# Patient Record
Sex: MALE | Race: WHITE | NOT HISPANIC OR LATINO | Employment: UNEMPLOYED | URBAN - METROPOLITAN AREA
[De-identification: names, ages, dates, MRNs, and addresses within clinical notes are randomized per-mention and may not be internally consistent; named-entity substitution may affect disease eponyms.]

---

## 2017-08-10 ENCOUNTER — HISTORICAL (OUTPATIENT)
Dept: RADIOLOGY | Facility: HOSPITAL | Age: 37
End: 2017-08-10

## 2022-09-04 ENCOUNTER — HOSPITAL ENCOUNTER (EMERGENCY)
Facility: HOSPITAL | Age: 42
Discharge: HOME | End: 2022-09-04
Attending: FAMILY MEDICINE
Payer: MEDICAID

## 2022-09-04 VITALS
TEMPERATURE: 99 F | WEIGHT: 160 LBS | SYSTOLIC BLOOD PRESSURE: 103 MMHG | BODY MASS INDEX: 21.2 KG/M2 | DIASTOLIC BLOOD PRESSURE: 66 MMHG | RESPIRATION RATE: 18 BRPM | OXYGEN SATURATION: 99 % | HEART RATE: 60 BPM | HEIGHT: 73 IN

## 2022-09-04 DIAGNOSIS — G58.8 PINCHED VERTEBRAL NERVE: Primary | ICD-10-CM

## 2022-09-04 PROCEDURE — 96372 THER/PROPH/DIAG INJ SC/IM: CPT | Performed by: FAMILY MEDICINE

## 2022-09-04 PROCEDURE — 99284 EMERGENCY DEPT VISIT MOD MDM: CPT

## 2022-09-04 PROCEDURE — 63600175 PHARM REV CODE 636 W HCPCS: Performed by: FAMILY MEDICINE

## 2022-09-04 RX ORDER — TRAMADOL HYDROCHLORIDE 50 MG/1
50 TABLET ORAL EVERY 6 HOURS PRN
Qty: 12 TABLET | Refills: 0 | Status: SHIPPED | OUTPATIENT
Start: 2022-09-04 | End: 2023-11-06 | Stop reason: ALTCHOICE

## 2022-09-04 RX ORDER — CYCLOBENZAPRINE HCL 10 MG
10 TABLET ORAL 3 TIMES DAILY PRN
Qty: 15 TABLET | Refills: 0 | Status: SHIPPED | OUTPATIENT
Start: 2022-09-04 | End: 2022-09-09

## 2022-09-04 RX ORDER — DEXAMETHASONE SODIUM PHOSPHATE 4 MG/ML
8 INJECTION, SOLUTION INTRA-ARTICULAR; INTRALESIONAL; INTRAMUSCULAR; INTRAVENOUS; SOFT TISSUE
Status: COMPLETED | OUTPATIENT
Start: 2022-09-04 | End: 2022-09-04

## 2022-09-04 RX ORDER — KETOROLAC TROMETHAMINE 30 MG/ML
30 INJECTION, SOLUTION INTRAMUSCULAR; INTRAVENOUS
Status: COMPLETED | OUTPATIENT
Start: 2022-09-04 | End: 2022-09-04

## 2022-09-04 RX ORDER — DEXAMETHASONE SODIUM PHOSPHATE 4 MG/ML
4 INJECTION, SOLUTION INTRA-ARTICULAR; INTRALESIONAL; INTRAMUSCULAR; INTRAVENOUS; SOFT TISSUE
Status: DISCONTINUED | OUTPATIENT
Start: 2022-09-04 | End: 2022-09-04

## 2022-09-04 RX ADMIN — DEXAMETHASONE SODIUM PHOSPHATE 8 MG: 4 INJECTION, SOLUTION INTRA-ARTICULAR; INTRALESIONAL; INTRAMUSCULAR; INTRAVENOUS; SOFT TISSUE at 07:09

## 2022-09-04 RX ADMIN — KETOROLAC TROMETHAMINE 30 MG: 30 INJECTION, SOLUTION INTRAMUSCULAR; INTRAVENOUS at 07:09

## 2022-09-04 NOTE — ED PROVIDER NOTES
Encounter Date: 9/4/2022       History     Chief Complaint   Patient presents with    Back Pain     Onset of middle lower back pain yesterday     Patient is a 41-year-old male who comes in with lower back pain that started yesterday.  He states that he normally has some degree of arthritis on his lower back but last night when he woke up he went to sit up in he had electric flank pain that started in the are that is hurting now, the lumbar area.  He denies any trauma he denies lifting any heavy and he is having a hard time walking because the pain is so sharp.    The history is provided by the patient.   Back Pain   This is a new problem. The current episode started 3 to 5 hours ago. The problem occurs constantly. The problem has been gradually worsening. The pain is associated with no known injury. The pain is present in the lumbar spine. The quality of the pain is described as shooting. The pain does not radiate. The pain is at a severity of 8/10. The symptoms are aggravated by bending and twisting. He has tried nothing for the symptoms. The treatment provided mild relief.   Review of patient's allergies indicates:   Allergen Reactions    Camphor-methyl sal-menthol-pep Hives     No past medical history on file.  No past surgical history on file.  No family history on file.     Review of Systems   Constitutional: Negative.    HENT: Negative.     Respiratory: Negative.     Cardiovascular: Negative.    Endocrine: Negative.    Musculoskeletal:  Positive for back pain.   Neurological: Negative.    Psychiatric/Behavioral: Negative.     All other systems reviewed and are negative.    Physical Exam     Initial Vitals [09/04/22 0635]   BP Pulse Resp Temp SpO2   105/80 (!) 56 18 98.8 °F (37.1 °C) 98 %      MAP       --         Physical Exam    Nursing note and vitals reviewed.  Constitutional: He appears well-developed and well-nourished.   HENT:   Head: Normocephalic.   Eyes: Pupils are equal, round, and reactive to light.    Neck:   Normal range of motion.  Cardiovascular:  Normal rate and regular rhythm.           Pulmonary/Chest: Breath sounds normal.   Abdominal: Abdomen is soft. Bowel sounds are normal.   Musculoskeletal:      Cervical back: Normal and normal range of motion.      Thoracic back: Spasms and tenderness present. Decreased range of motion.      Lumbar back: Normal.        Back:      Neurological: He is alert and oriented to person, place, and time.   Skin: Skin is warm and dry.   Psychiatric: He has a normal mood and affect.       ED Course   Procedures  Labs Reviewed - No data to display       Imaging Results              X-Ray Lumbar Spine Ap And Lateral (In process)                      Medications   dexamethasone injection 8 mg (has no administration in time range)   ketorolac injection 30 mg (has no administration in time range)   dexamethasone injection 4 mg (has no administration in time range)     Medical Decision Making:   Initial Assessment:   This patient is a 41-year-old male who comes in with shocking lower back pain that woke him up out of his sleep  Differential Diagnosis:   Pinched nerve, back strain  Clinical Tests:   Radiological Study: Ordered and Reviewed  ED Management:  As per patient history and where his Pain is located , the pain seems more like a nerve pain.  X-ray of the lumbar spine was done and there appears to be a slight scoliosis but otherwise is normal.  Will discharge home with pain medicine and muscle relaxant                    Clinical Impression:   Final diagnoses:  [G58.8] Pinched vertebral nerve (Primary)             James Acevedo MD  09/04/22 0706

## 2022-09-04 NOTE — Clinical Note
"Ángel Stuart (Robert)bodeaux was seen and treated in our emergency department on 9/4/2022.  He may return to work on 09/06/2022.       If you have any questions or concerns, please don't hesitate to call.      James Acevedo MD"

## 2023-09-06 ENCOUNTER — HOSPITAL ENCOUNTER (EMERGENCY)
Facility: HOSPITAL | Age: 43
Discharge: HOME | End: 2023-09-06
Attending: PHYSICAL MEDICINE & REHABILITATION
Payer: MEDICAID

## 2023-09-06 VITALS
HEART RATE: 80 BPM | RESPIRATION RATE: 16 BRPM | TEMPERATURE: 98 F | HEIGHT: 72 IN | DIASTOLIC BLOOD PRESSURE: 71 MMHG | WEIGHT: 160 LBS | SYSTOLIC BLOOD PRESSURE: 108 MMHG | BODY MASS INDEX: 21.67 KG/M2 | OXYGEN SATURATION: 97 %

## 2023-09-06 DIAGNOSIS — M79.642 PAIN OF LEFT HAND: Primary | ICD-10-CM

## 2023-09-06 DIAGNOSIS — K13.70 ORAL LESION: ICD-10-CM

## 2023-09-06 PROCEDURE — 99284 EMERGENCY DEPT VISIT MOD MDM: CPT

## 2023-09-06 RX ORDER — IBUPROFEN 800 MG/1
800 TABLET ORAL EVERY 6 HOURS PRN
Qty: 20 TABLET | Refills: 0 | Status: SHIPPED | OUTPATIENT
Start: 2023-09-06 | End: 2023-11-06 | Stop reason: ALTCHOICE

## 2023-09-06 RX ORDER — AMOXICILLIN 500 MG/1
500 CAPSULE ORAL 3 TIMES DAILY
Qty: 21 CAPSULE | Refills: 0 | Status: SHIPPED | OUTPATIENT
Start: 2023-09-06 | End: 2023-09-13

## 2023-09-06 NOTE — ED NOTES
On discharge. Valentin taped  fourth and fifth digit of left hand. No discoloration, swelling and pain to touch while taping fingers.

## 2023-09-06 NOTE — ED PROVIDER NOTES
Encounter Date: 9/6/2023       History     Chief Complaint   Patient presents with    Hand Pain     Left 5th digit pain, swelling, onset yesterday     Hand Pain    Left 5th digit pain, swelling, onset yesterday, ALSO WITH CHRONIC CYSTIC LESION LEFT SIDE OF THE MOUTH BEEN GREATER THAN 5 YEARS HAS NOT SEEN A DENTIST OR ORAL SURGEON IN A PERIOD OF TIME    The history is provided by the patient.     Review of patient's allergies indicates:   Allergen Reactions    Camphor-methyl sal-menthol-pep Hives     Past Medical History:   Diagnosis Date    Seizure disorder     last seizure, 2017     Past Surgical History:   Procedure Laterality Date    BRAIN TUMOR EXCISION      2017     History reviewed. No pertinent family history.  Social History     Tobacco Use    Smoking status: Never    Smokeless tobacco: Never    Tobacco comments:     vaps   Substance Use Topics    Alcohol use: Yes     Comment: occassional    Drug use: Never     Review of Systems   HENT:  Positive for dental problem (ORAL/GUM LESION).    Musculoskeletal:  Positive for joint swelling (LEFT 5H PIP).   All other systems reviewed and are negative.      Physical Exam     Initial Vitals [09/06/23 0655]   BP Pulse Resp Temp SpO2   108/79 89 18 97.9 °F (36.6 °C) 97 %      MAP       --         Physical Exam    Nursing note and vitals reviewed.  Constitutional: He appears well-developed and well-nourished.   HENT:   Head: Normocephalic and atraumatic.   CYSTIC LESION ALONG THE LEFT GUM POOR DENTITION THROUGHOUT   Eyes: Conjunctivae and EOM are normal. Pupils are equal, round, and reactive to light.   Neck: Neck supple.   Normal range of motion.  Cardiovascular:  Normal rate and regular rhythm.           Pulmonary/Chest: Breath sounds normal.   Abdominal: Abdomen is soft. Bowel sounds are normal.   Musculoskeletal:         General: Tenderness present.      Cervical back: Normal range of motion and neck supple.      Comments: LEFT 5TH PIP MILD ERYTHEMA MILD SWELLING  DENIES ANY INJURY DECREASED RANGE OF MOTION NO CREPITUS NO GROSS DEFORMITY     Neurological: He is alert and oriented to person, place, and time. He has normal strength. GCS score is 15. GCS eye subscore is 4. GCS verbal subscore is 5. GCS motor subscore is 6.   Skin: Skin is warm and dry. Capillary refill takes less than 2 seconds.   Psychiatric: He has a normal mood and affect.         ED Course   Procedures  Labs Reviewed - No data to display       Imaging Results              X-Ray Hand 3 View Left (Final result)  Result time 09/06/23 07:42:15      Final result by Piyush Lang MD (09/06/23 07:42:15)                   Impression:      No acute abnormality of the left hand.      Electronically signed by: Piyush Lang MD  Date:    09/06/2023  Time:    07:42               Narrative:    EXAMINATION:  Three radiographic views of the LEFT HAND.    CLINICAL HISTORY:  pain;    TECHNIQUE:  Three radiographic views of the LEFT HAND.    COMPARISON:  None.    FINDINGS:  Three views of the left hand demonstrate normal alignment.  There is no fracture.  There is no bony mass lesion.  There is no soft tissue swelling.                                    X-Rays:   Independently Interpreted Readings:   Other Readings:  NEGATIVE    Medications - No data to display  Medical Decision Making  Chief Complaint  Patient presents with  · Hand Pain    Left 5th digit pain, swelling, onset yesterday, ALSO WITH CHRONIC CYSTIC LESION LEFT SIDE OF THE MOUTH BEEN GREATER THAN 5 YEARS HAS NOT SEEN A DENTIST OR ORAL SURGEON IN A PERIOD OF TIME      Amount and/or Complexity of Data Reviewed  Radiology: ordered.     Details: NEGATIVE  Discussion of management or test interpretation with external provider(s): REVIEWED FINDINGS WITH THE PATIENT SUGGEST JORGE TAPING INSTRUCTED HOW TO DO IT FOLLOW UP WITH PCP STRONGLY SUGGEST FOLLOWING UP WITH DENTIST OR ORAL SURGEON SECONDARY TO CHRONIC LESION    Risk  Prescription drug management.                                Clinical Impression:   Final diagnoses:  [M79.642] Pain of left hand (Primary)  [K13.70] Oral lesion        ED Disposition Condition    Discharge Stable          ED Prescriptions       Medication Sig Dispense Start Date End Date Auth. Provider    amoxicillin (AMOXIL) 500 MG capsule Take 1 capsule (500 mg total) by mouth 3 (three) times daily. for 7 days 21 capsule 9/6/2023 9/13/2023 Chris Rodas DO    ibuprofen (ADVIL,MOTRIN) 800 MG tablet Take 1 tablet (800 mg total) by mouth every 6 (six) hours as needed for Pain. 20 tablet 9/6/2023 -- Chris Rodas DO          Follow-up Information       Follow up With Specialties Details Why Contact Info    [facility] In 3 days As needed, NEED A DENTIST/ORAL SURGEON [address] [place] [address] [id number] [phone]             Chrsi Rodas DO  09/06/23 0863

## 2023-09-06 NOTE — ED NOTES
Complaint of pain, swelling to left 5th digit at joint. This started yesterday. 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 Also has swelling to left cheek area.. swelliing pain. He has had this since birth, he never seen by doctor for this and he states swelling, pain worse today. Also has been tired for last few days.

## 2023-11-06 ENCOUNTER — HOSPITAL ENCOUNTER (EMERGENCY)
Facility: HOSPITAL | Age: 43
Discharge: HOME | End: 2023-11-06
Attending: FAMILY MEDICINE
Payer: MEDICAID

## 2023-11-06 VITALS
BODY MASS INDEX: 20.32 KG/M2 | WEIGHT: 150 LBS | TEMPERATURE: 97 F | RESPIRATION RATE: 20 BRPM | DIASTOLIC BLOOD PRESSURE: 78 MMHG | HEART RATE: 60 BPM | OXYGEN SATURATION: 99 % | HEIGHT: 72 IN | SYSTOLIC BLOOD PRESSURE: 122 MMHG

## 2023-11-06 DIAGNOSIS — J01.00 ACUTE NON-RECURRENT MAXILLARY SINUSITIS: Primary | ICD-10-CM

## 2023-11-06 PROCEDURE — 25000003 PHARM REV CODE 250: Performed by: FAMILY MEDICINE

## 2023-11-06 PROCEDURE — 99284 EMERGENCY DEPT VISIT MOD MDM: CPT | Mod: 25

## 2023-11-06 RX ORDER — METHYLPREDNISOLONE 4 MG/1
TABLET ORAL
Qty: 1 EACH | Refills: 0 | Status: SHIPPED | OUTPATIENT
Start: 2023-11-06 | End: 2023-11-27

## 2023-11-06 RX ORDER — TRAMADOL HYDROCHLORIDE 50 MG/1
50 TABLET ORAL EVERY 6 HOURS PRN
Qty: 12 TABLET | Refills: 0 | Status: SHIPPED | OUTPATIENT
Start: 2023-11-06

## 2023-11-06 RX ORDER — HYDROCODONE BITARTRATE AND ACETAMINOPHEN 7.5; 325 MG/1; MG/1
1 TABLET ORAL EVERY 6 HOURS PRN
Status: DISCONTINUED | OUTPATIENT
Start: 2023-11-06 | End: 2023-11-06 | Stop reason: HOSPADM

## 2023-11-06 RX ADMIN — HYDROCODONE BITARTRATE AND ACETAMINOPHEN 1 TABLET: 7.5; 325 TABLET ORAL at 08:11

## 2023-11-06 NOTE — ED NOTES
Pt ambulated to room 1 with steady gait. Pt states he has had a migraine for 3 weeks. Pt rates pain to head 8/10. Pt states he took Excedrin at 6 am. Pt denies needs at this time.

## 2023-11-06 NOTE — ED PROVIDER NOTES
Encounter Date: 11/6/2023       History     Chief Complaint   Patient presents with    Headache     Migraine x 3 weeks took excedrin migraine at 6 am, denies any N/V. Light sensitive. With blurry vision on right eye.     This patient is a 42-year-old male who states he has been having a migraine for the past 3 weeks on and off.  Patient states that he is had an operation in the area of his left temple a proximally 8 years ago and he is concerned because he has not had a headache that lasts this long for awhile.  He denies using any pain medicine.  Denies any weakness but states that his left eye sometimes bothers him.    The history is provided by the patient.     Review of patient's allergies indicates:   Allergen Reactions    Camphor-methyl sal-menthol-pep Hives     Past Medical History:   Diagnosis Date    Seizure disorder     last seizure, 2017     Past Surgical History:   Procedure Laterality Date    BRAIN TUMOR EXCISION      2017     History reviewed. No pertinent family history.  Social History     Tobacco Use    Smoking status: Never    Smokeless tobacco: Never    Tobacco comments:     vaps   Substance Use Topics    Alcohol use: Yes     Comment: occassional    Drug use: Never     Review of Systems   Constitutional: Negative.    HENT: Negative.     Respiratory: Negative.     Cardiovascular: Negative.    Endocrine: Negative.    Musculoskeletal: Negative.    Neurological:  Positive for headaches.   Psychiatric/Behavioral: Negative.     All other systems reviewed and are negative.      Physical Exam     Initial Vitals [11/06/23 0740]   BP Pulse Resp Temp SpO2   116/75 (!) 53 18 96.8 °F (36 °C) 100 %      MAP       --         Physical Exam    Nursing note and vitals reviewed.  Constitutional: He appears well-developed and well-nourished.   HENT:   Head: Normocephalic.   Eyes: Pupils are equal, round, and reactive to light.   Neck:   Normal range of motion.  Cardiovascular:  Normal rate and regular rhythm.            Pulmonary/Chest: Breath sounds normal.   Abdominal: Abdomen is soft. Bowel sounds are normal.   Musculoskeletal:         General: Normal range of motion.      Cervical back: Normal range of motion.     Neurological: He is alert and oriented to person, place, and time. He has normal strength and normal reflexes. He displays a negative Romberg sign.   Skin: Skin is warm and dry.   Psychiatric: He has a normal mood and affect.         ED Course   Procedures  Labs Reviewed - No data to display       Imaging Results              CT Maxillofacial Without Contrast (Final result)  Result time 11/06/23 09:16:04      Final result by Joy Rogers MD (11/06/23 09:16:04)                   Impression:      No acute abnormality identified.      Electronically signed by: Joy Rogers  Date:    11/06/2023  Time:    09:16               Narrative:    EXAMINATION:  CT MAXILLOFACIAL WITHOUT CONTRAST    CLINICAL HISTORY:  Head/neck cancer, monitor;    TECHNIQUE:  Volumetric CT acquisition of the facial bones without contrast. Axial, coronal and sagittal reconstructions.    Automatic exposure control was utilized to limit radiation dose.    DLP: 1083 mGy-cm    COMPARISON:  CT face dated 10/19/2019    FINDINGS:  There is no acute fracture identified.  There are postoperative changes of the left frontal calvarium.  There is mild scattered paranasal sinus mucosal thickening.  The mastoid air cells are clear.  The orbits and soft tissues are unremarkable.                                       CT Head Without Contrast (Final result)  Result time 11/06/23 09:14:00      Final result by Joy Rogers MD (11/06/23 09:14:00)                   Impression:      No acute intracranial abnormality or significant interval change.      Electronically signed by: Joy Rogers  Date:    11/06/2023  Time:    09:14               Narrative:    EXAMINATION:  CT HEAD WITHOUT CONTRAST    CLINICAL HISTORY:  Brain/CNS neoplasm,  monitor;    TECHNIQUE:  Axial scans were obtained from skull base to the vertex.    Coronal and sagittal reconstructions obtained from the axial data.    Automatic exposure control was utilized to limit radiation dose.    Contrast: None    Radiation Dose:    Total DLP: 1083 mGy*cm    COMPARISON:  CT head dated 08/16/2016    FINDINGS:  There is no acute intracranial hemorrhage or edema.  There is encephalomalacia in the left frontal lobe.    There is no mass effect or midline shift.  There is diffuse parenchymal volume loss.  The basal cisterns are patent. There is no abnormal extra-axial fluid collection.    There are postoperative changes of the left calvarium.  There is mild scattered paranasal sinus mucosal thickening.                                       Medications   HYDROcodone-acetaminophen 7.5-325 mg per tablet 1 tablet (1 tablet Oral Given 11/6/23 0858)     Medical Decision Making  This patient is a 42-year-old male who comes in with a migraine that he has had on and off for the past 3 weeks.  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  He is concerned that maybe the tumor has returned.  He states that he has blurry vision sometimes in his left eye but other than that he is does not have any weakness or paralysis.  Differential diagnosis colon cancer, sinusitis, migraine    Amount and/or Complexity of Data Reviewed  Radiology: ordered.     Details: Patient has a CT scan of the head and maxillofacial area with no acute abnormality identified except slight sinusitis.                               Clinical Impression:   Final diagnoses:  [J01.00] Acute non-recurrent maxillary sinusitis (Primary)        ED Disposition Condition    Discharge Stable          ED Prescriptions       Medication Sig Dispense Start Date End Date Auth. Provider    methylPREDNISolone (MEDROL DOSEPACK) 4 mg tablet  1 each 11/6/2023 11/27/2023 James Acevedo MD     traMADoL (ULTRAM) 50 mg tablet Take 1 tablet (50 mg total) by mouth every 6 (six) hours as needed for Pain. 12 tablet 11/6/2023 -- James Acevedo MD          Follow-up Information    None          James Acevedo MD  11/06/23 0003

## 2023-11-27 ENCOUNTER — HOSPITAL ENCOUNTER (EMERGENCY)
Facility: HOSPITAL | Age: 43
Discharge: HOME | End: 2023-11-27
Attending: FAMILY MEDICINE
Payer: MEDICAID

## 2023-11-27 VITALS
HEART RATE: 75 BPM | WEIGHT: 160 LBS | RESPIRATION RATE: 20 BRPM | SYSTOLIC BLOOD PRESSURE: 122 MMHG | HEIGHT: 72 IN | DIASTOLIC BLOOD PRESSURE: 75 MMHG | BODY MASS INDEX: 21.67 KG/M2 | OXYGEN SATURATION: 100 % | TEMPERATURE: 98 F

## 2023-11-27 DIAGNOSIS — K08.89 PAIN, DENTAL: Primary | ICD-10-CM

## 2023-11-27 PROCEDURE — 99284 EMERGENCY DEPT VISIT MOD MDM: CPT

## 2023-11-27 RX ORDER — AMOXICILLIN 500 MG/1
500 CAPSULE ORAL 3 TIMES DAILY
Qty: 30 CAPSULE | Refills: 0 | Status: SHIPPED | OUTPATIENT
Start: 2023-11-27 | End: 2023-12-07

## 2023-11-27 RX ORDER — TRAMADOL HYDROCHLORIDE 50 MG/1
50 TABLET ORAL EVERY 6 HOURS PRN
Qty: 12 TABLET | Refills: 0 | Status: SHIPPED | OUTPATIENT
Start: 2023-11-27

## 2023-11-27 NOTE — ED NOTES
Pt to ed with left lower rear dental pain. Reports ha hx of wisdom teeth issues. Multiple broken teeth. Trying to self treat at home.

## 2023-11-27 NOTE — ED PROVIDER NOTES
Encounter Date: 11/27/2023       History     Chief Complaint   Patient presents with    Dental Pain     Left upper wisdom tooth x 1 week.       This patient is a 42-year-old male who comes in with left lower wisdom tooth pain.  Patient states that he gets this pain intermittently but today is worse than ever.  He had been taking amoxicillin from previous infection but states that he can not stand the pain and he will not be seen by    The history is provided by the patient.     Review of patient's allergies indicates:   Allergen Reactions    Camphor-methyl sal-menthol-pep Hives     Past Medical History:   Diagnosis Date    Seizure disorder     last seizure, 2017     Past Surgical History:   Procedure Laterality Date    BRAIN TUMOR EXCISION      2017     History reviewed. No pertinent family history.  Social History     Tobacco Use    Smoking status: Never    Smokeless tobacco: Never    Tobacco comments:     vaps   Substance Use Topics    Alcohol use: Yes     Comment: occassional    Drug use: Never     Review of Systems   Constitutional: Negative.    HENT:  Positive for dental problem.    Respiratory: Negative.     Cardiovascular: Negative.    Endocrine: Negative.    Neurological: Negative.    Psychiatric/Behavioral: Negative.     All other systems reviewed and are negative.      Physical Exam     Initial Vitals [11/27/23 0957]   BP Pulse Resp Temp SpO2   122/75 75 20 98 °F (36.7 °C) 100 %      MAP       --         Physical Exam    Nursing note and vitals reviewed.  Constitutional: He appears well-developed and well-nourished.   HENT:   Head: Normocephalic.   Mouth/Throat:       Patient has many missing teeth and left posterior lower wisdom tooth is broken   Eyes: Pupils are equal, round, and reactive to light.   Neck:   Normal range of motion.  Cardiovascular:  Normal rate and regular rhythm.           Pulmonary/Chest: Breath sounds normal.   Abdominal: Abdomen is soft. Bowel sounds are normal.   Musculoskeletal:          General: Normal range of motion.      Cervical back: Normal range of motion.     Neurological: He is alert and oriented to person, place, and time.   Skin: Skin is warm and dry.   Psychiatric: He has a normal mood and affect.         ED Course   Procedures  Labs Reviewed - No data to display       Imaging Results    None          Medications - No data to display  Medical Decision Making  This patient is a 42-year-old male who comes in with dental pain.  Patient has left lower wisdom tooth is broken and causing patient pain  Differential diagnosis: Broken tooth, dental pain                                      Clinical Impression:  Final diagnoses:  [K08.89] Pain, dental (Primary)          ED Disposition Condition    Discharge Stable          ED Prescriptions       Medication Sig Dispense Start Date End Date Auth. Provider    traMADoL (ULTRAM) 50 mg tablet Take 1 tablet (50 mg total) by mouth every 6 (six) hours as needed. 12 tablet 11/27/2023 -- James Acevedo MD    amoxicillin (AMOXIL) 500 MG capsule Take 1 capsule (500 mg total) by mouth 3 (three) times daily. for 10 days 30 capsule 11/27/2023 12/7/2023 James Acevedo MD          Follow-up Information       Follow up With Specialties Details Why Contact Info    Dentist  Schedule an appointment as soon as possible for a visit in 2 days               James Acevedo MD  11/27/23 3058

## 2023-12-30 ENCOUNTER — HOSPITAL ENCOUNTER (EMERGENCY)
Facility: HOSPITAL | Age: 43
Discharge: HOME | End: 2023-12-30
Attending: EMERGENCY MEDICINE
Payer: MEDICAID

## 2023-12-30 VITALS
HEART RATE: 89 BPM | RESPIRATION RATE: 18 BRPM | DIASTOLIC BLOOD PRESSURE: 67 MMHG | SYSTOLIC BLOOD PRESSURE: 105 MMHG | TEMPERATURE: 101 F | OXYGEN SATURATION: 98 % | HEIGHT: 72 IN | WEIGHT: 162 LBS | BODY MASS INDEX: 21.94 KG/M2

## 2023-12-30 DIAGNOSIS — U07.1 COVID-19: Primary | ICD-10-CM

## 2023-12-30 LAB
FLUAV AG UPPER RESP QL IA.RAPID: NOT DETECTED
FLUBV AG UPPER RESP QL IA.RAPID: NOT DETECTED
RSV A 5' UTR RNA NPH QL NAA+PROBE: NOT DETECTED
SARS-COV-2 RNA RESP QL NAA+PROBE: DETECTED
STREP A PCR (OHS): NOT DETECTED

## 2023-12-30 PROCEDURE — 99284 EMERGENCY DEPT VISIT MOD MDM: CPT

## 2023-12-30 PROCEDURE — 0241U COVID/RSV/FLU A&B PCR: CPT | Performed by: EMERGENCY MEDICINE

## 2023-12-30 PROCEDURE — 87651 STREP A DNA AMP PROBE: CPT | Performed by: EMERGENCY MEDICINE

## 2023-12-30 RX ORDER — PROMETHAZINE HYDROCHLORIDE 25 MG/1
25 TABLET ORAL EVERY 4 HOURS
Qty: 12 TABLET | Refills: 0 | Status: SHIPPED | OUTPATIENT
Start: 2023-12-30

## 2023-12-30 RX ORDER — METHYLPREDNISOLONE 4 MG/1
TABLET ORAL
Qty: 21 EACH | Refills: 0 | Status: SHIPPED | OUTPATIENT
Start: 2023-12-30 | End: 2024-01-20

## 2023-12-30 NOTE — ED PROVIDER NOTES
Encounter Date: 12/30/2023       History     Chief Complaint   Patient presents with    Headache    Sore Throat     C/O Headache, sore throat, and chills starting yesterday around 1 pm. Pt states he took an amoxicillin that he had left over 1 hr pta and Robitussin around 0030 with no relief. Pt also states he was around his boss who has covid. AAOx4. Temp on arrival 100.8 and headache 9/10 on pain scale.      Patient is a 43-year-old male presenting with complain of sore throat, cough, body aches, headache and intermittent nausea.  No vomiting.  Patient denies any nausea currently.  Patient states that these symptoms started yesterday afternoon.  Patient denies chest pain or shortness of breath.  No purulent sputum.  Patient denies abdominal pain.  No urinary symptoms.      Review of patient's allergies indicates:   Allergen Reactions    Camphor-methyl sal-menthol-pep Hives     Past Medical History:   Diagnosis Date    Seizure disorder     last seizure, 2017     Past Surgical History:   Procedure Laterality Date    BRAIN TUMOR EXCISION      2017     History reviewed. No pertinent family history.  Social History     Tobacco Use    Smoking status: Some Days     Types: Vaping with nicotine    Smokeless tobacco: Never    Tobacco comments:     vaps   Substance Use Topics    Alcohol use: Yes     Comment: occassional    Drug use: Never     Review of Systems   Constitutional:  Positive for chills and fever.   HENT:  Positive for sore throat. Negative for congestion, ear pain, trouble swallowing and voice change.    Respiratory:  Positive for cough. Negative for choking, chest tightness and shortness of breath.    Cardiovascular: Negative.    Gastrointestinal: Negative.    Genitourinary: Negative.    Musculoskeletal:  Positive for myalgias. Negative for arthralgias, back pain, neck pain and neck stiffness.   Skin: Negative.    Neurological:  Positive for headaches. Negative for syncope, weakness and light-headedness.    Psychiatric/Behavioral: Negative.         Physical Exam     Initial Vitals [12/30/23 0409]   BP Pulse Resp Temp SpO2   105/67 109 18 (!) 100.8 °F (38.2 °C) 96 %      MAP       --         Physical Exam    Nursing note and vitals reviewed.  Constitutional: He appears well-developed and well-nourished.   HENT:   Head: Normocephalic and atraumatic.   Mouth/Throat: Oropharynx is clear and moist.   Neck: Neck supple.   Normal range of motion.  Cardiovascular:  Normal rate, regular rhythm and normal heart sounds.           Pulmonary/Chest: Breath sounds normal. No respiratory distress. He has no wheezes. He has no rhonchi. He has no rales.   Abdominal: Abdomen is soft. There is no abdominal tenderness.   Musculoskeletal:         General: Normal range of motion.      Cervical back: Normal range of motion and neck supple.     Neurological: He is alert and oriented to person, place, and time. He has normal strength.   Skin: Skin is warm.   Psychiatric: He has a normal mood and affect. Thought content normal.         ED Course   Procedures  Labs Reviewed   COVID/RSV/FLU A&B PCR - Abnormal; Notable for the following components:       Result Value    SARS-CoV-2 PCR Detected (*)     All other components within normal limits    Narrative:     The Xpert Xpress SARS-CoV-2/FLU/RSV plus is a rapid, multiplexed real-time PCR test intended for the simultaneous qualitative detection and differentiation of SARS-CoV-2, Influenza A, Influenza B, and respiratory syncytial virus (RSV) viral RNA in either nasopharyngeal swab or nasal swab specimens.         STREP GROUP A BY PCR - Normal    Narrative:     The Xpert Xpress Strep A test is a rapid, qualitative in vitro diagnostic test for the detection of Streptococcus pyogenes (Group A ß-hemolytic Streptococcus, Strep A) in throat swab specimens from patients with signs and symptoms of pharyngitis.            Imaging Results    None          Medications - No data to display  Medical Decision  Making  Differential diagnosis: Viral syndrome, COVID-19, influenza, strep throat    Amount and/or Complexity of Data Reviewed  Labs: ordered.     Details: COVID is positive  Discussion of management or test interpretation with external provider(s): Will discharge patient with prescription for Medrol Dosepak and Phenergan.                                      Clinical Impression:  Final diagnoses:  [U07.1] COVID-19 (Primary)          ED Disposition Condition    Discharge Stable          ED Prescriptions       Medication Sig Dispense Start Date End Date Auth. Provider    methylPREDNISolone (MEDROL DOSEPACK) 4 mg tablet use as directed 21 each 12/30/2023 1/20/2024 Fer Castañeda MD    promethazine (PHENERGAN) 25 MG tablet Take 1 tablet (25 mg total) by mouth every 4 (four) hours. 12 tablet 12/30/2023 -- Fer Castañeda MD          Follow-up Information       Follow up With Specialties Details Why Contact Info    [facility] - Emergency Dept Emergency Medicine  As needed [address] [place] [id number] [phone]             Fer Castañeda MD  12/30/23 2687

## 2024-02-06 ENCOUNTER — HOSPITAL ENCOUNTER (EMERGENCY)
Facility: HOSPITAL | Age: 44
Discharge: HOME | End: 2024-02-06
Attending: FAMILY MEDICINE
Payer: MEDICAID

## 2024-02-06 VITALS
RESPIRATION RATE: 20 BRPM | HEIGHT: 73 IN | TEMPERATURE: 97 F | HEART RATE: 88 BPM | DIASTOLIC BLOOD PRESSURE: 80 MMHG | BODY MASS INDEX: 21.47 KG/M2 | WEIGHT: 162 LBS | OXYGEN SATURATION: 99 % | SYSTOLIC BLOOD PRESSURE: 125 MMHG

## 2024-02-06 DIAGNOSIS — S00.83XA CONTUSION OF FOREHEAD, INITIAL ENCOUNTER: ICD-10-CM

## 2024-02-06 DIAGNOSIS — S00.81XA ABRASION OF FOREHEAD, INITIAL ENCOUNTER: Primary | ICD-10-CM

## 2024-02-06 DIAGNOSIS — R52 PAIN: ICD-10-CM

## 2024-02-06 DIAGNOSIS — M54.14 THORACIC RADICULOPATHY: ICD-10-CM

## 2024-02-06 PROCEDURE — 63600175 PHARM REV CODE 636 W HCPCS: Performed by: FAMILY MEDICINE

## 2024-02-06 PROCEDURE — 99285 EMERGENCY DEPT VISIT HI MDM: CPT | Mod: 25

## 2024-02-06 PROCEDURE — 96372 THER/PROPH/DIAG INJ SC/IM: CPT | Performed by: FAMILY MEDICINE

## 2024-02-06 RX ORDER — TRAMADOL HYDROCHLORIDE 50 MG/1
50 TABLET ORAL EVERY 6 HOURS PRN
Qty: 12 TABLET | Refills: 0 | Status: SHIPPED | OUTPATIENT
Start: 2024-02-06 | End: 2024-02-06

## 2024-02-06 RX ORDER — METHYLPREDNISOLONE 4 MG/1
TABLET ORAL
Qty: 1 EACH | Refills: 0 | Status: SHIPPED | OUTPATIENT
Start: 2024-02-06 | End: 2024-02-06

## 2024-02-06 RX ORDER — KETOROLAC TROMETHAMINE 30 MG/ML
60 INJECTION, SOLUTION INTRAMUSCULAR; INTRAVENOUS
Status: COMPLETED | OUTPATIENT
Start: 2024-02-06 | End: 2024-02-06

## 2024-02-06 RX ORDER — TRAMADOL HYDROCHLORIDE 50 MG/1
50 TABLET ORAL EVERY 6 HOURS PRN
Qty: 12 TABLET | Refills: 0 | Status: SHIPPED | OUTPATIENT
Start: 2024-02-06 | End: 2024-06-17 | Stop reason: ALTCHOICE

## 2024-02-06 RX ORDER — METHYLPREDNISOLONE 4 MG/1
TABLET ORAL
Qty: 1 EACH | Refills: 0 | Status: SHIPPED | OUTPATIENT
Start: 2024-02-06 | End: 2024-02-27

## 2024-02-06 RX ADMIN — KETOROLAC TROMETHAMINE 60 MG: 30 INJECTION, SOLUTION INTRAMUSCULAR; INTRAVENOUS at 07:02

## 2024-02-06 NOTE — ED NOTES
Pt arrived POV. Ambulated to room 3 with steady gait. Complains of falling and hitting forehead on side of truck. Pt has approximately 1 cm abrasion to forehead. Pt denies needs at this time. Denies any LOC.

## 2024-02-06 NOTE — ED PROVIDER NOTES
Encounter Date: 2/6/2024       History     Chief Complaint   Patient presents with    Head Laceration     Head laceration this am.  States back gave out and he hit head on back of truck.     This patient is a 43-year-old male who states that his back gave out on him while he was walking to his truck and he fell, hitting his right forehead on the truck.  He denies any loss of consciousness but he states that he has a history of a malignant brain tumor.  States that the pain in his back that is going on for approximately 1 week    The history is provided by the patient.     Review of patient's allergies indicates:   Allergen Reactions    Camphor-methyl sal-menthol-pep Hives     Past Medical History:   Diagnosis Date    Seizure disorder     last seizure, 2017     Past Surgical History:   Procedure Laterality Date    BRAIN TUMOR EXCISION      2017     History reviewed. No pertinent family history.  Social History     Tobacco Use    Smoking status: Some Days     Types: Vaping with nicotine    Smokeless tobacco: Never    Tobacco comments:     vaps   Substance Use Topics    Alcohol use: Yes     Comment: occassional    Drug use: Never     Review of Systems   Constitutional: Negative.    HENT: Negative.     Respiratory: Negative.     Cardiovascular: Negative.    Endocrine: Negative.    Musculoskeletal:  Positive for back pain.   Neurological:  Positive for headaches.   Psychiatric/Behavioral: Negative.     All other systems reviewed and are negative.      Physical Exam     Initial Vitals [02/06/24 0739]   BP Pulse Resp Temp SpO2   121/89 71 20 97.2 °F (36.2 °C) 100 %      MAP       --         Physical Exam    Nursing note and vitals reviewed.  Constitutional: He appears well-developed and well-nourished.   HENT:   Head: Normocephalic.       Superficial 1.5 cm in length scratch on the right forehead.  Bleeding controlled   Eyes: Pupils are equal, round, and reactive to light.   Neck:   Normal range of motion.  Cardiovascular:   Normal rate and regular rhythm.           Pulmonary/Chest: Breath sounds normal.   Abdominal: Abdomen is soft. Bowel sounds are normal.   Musculoskeletal:         General: Normal range of motion.      Cervical back: Normal range of motion.     Neurological: He is alert and oriented to person, place, and time. He has normal strength. No cranial nerve deficit or sensory deficit. GCS eye subscore is 4. GCS verbal subscore is 5. GCS motor subscore is 6.   Skin: Skin is warm and dry.   Psychiatric: He has a normal mood and affect.         ED Course   Procedures  Labs Reviewed - No data to display       Imaging Results              X-Ray Thoracic Spine AP Lateral (Final result)  Result time 02/06/24 08:42:05      Final result by Joy Rogers MD (02/06/24 08:42:05)                   Impression:      No acute abnormality identified.      Electronically signed by: Joy Rogers  Date:    02/06/2024  Time:    08:42               Narrative:    EXAMINATION:  XR THORACIC SPINE AP LATERAL    CLINICAL HISTORY:  Pain, unspecified    COMPARISON:  X-rays dated 07/21/2017    FINDINGS:  Thoracic alignment is preserved.  The vertebral heights and disc spaces are maintained.  There are tiny multilevel marginal osteophytes.  The soft tissues are unremarkable.                                       CT Head Without Contrast (Final result)  Result time 02/06/24 08:13:28      Final result by Joy Rogers MD (02/06/24 08:13:28)                   Impression:      No acute intracranial abnormality or significant interval change.      Electronically signed by: Joy Rogers  Date:    02/06/2024  Time:    08:13               Narrative:    EXAMINATION:  CT HEAD WITHOUT CONTRAST    CLINICAL HISTORY:  contusion with h/o brain tumor;    TECHNIQUE:  Axial scans were obtained from skull base to the vertex.    Coronal and sagittal reconstructions obtained from the axial data.    Automatic exposure control was utilized to limit  radiation dose.    Contrast: None    Radiation Dose:    Total DLP: 872 mGy*cm    COMPARISON:  CT head dated 11/06/2023    FINDINGS:  There is no acute intracranial hemorrhage or edema.  There is encephalomalacia in the left frontal lobe.    There is no mass effect or midline shift. The ventricles and sulci are normal in size. The basal cisterns are patent. There is no abnormal extra-axial fluid collection.    There are postoperative changes from prior left-sided craniotomy.  There is moderate left-sided paranasal sinus mucosal thickening.                                       Medications   ketorolac injection 60 mg (60 mg Intramuscular Given 2/6/24 0756)     Medical Decision Making  This patient is a 43-year-old male who comes in after his back gave out, he fell and hit his right forehead on his truck.  He denies any loss of consciousness.  States he has a slight headache but the worst pain is in his back, midback  Differential diagnosis: Thoracic radiculopathy, cancer recurrence, laceration of the forehead,    Amount and/or Complexity of Data Reviewed  Radiology: ordered.     Details: X-ray of thoracic spine shows no acute abnormality and a CT of the head without contrast is normal                                      Clinical Impression:  Final diagnoses:  [R52] Pain  [S00.81XA] Abrasion of forehead, initial encounter (Primary)  [S00.83XA] Contusion of forehead, initial encounter  [M54.14] Thoracic radiculopathy          ED Disposition Condition    Discharge Stable          ED Prescriptions       Medication Sig Dispense Start Date End Date Auth. Provider    traMADoL (ULTRAM) 50 mg tablet  (Status: Discontinued) Take 1 tablet (50 mg total) by mouth every 6 (six) hours as needed. 12 tablet 2/6/2024 2/6/2024 James Acevedo MD    methylPREDNISolone (MEDROL DOSEPACK) 4 mg tablet  (Status: Discontinued) Take as directed 1 each 2/6/2024 2/6/2024 James Acevedo MD    methylPREDNISolone (MEDROL  DOSEPACK) 4 mg tablet Take as directed 1 each 2/6/2024 2/27/2024 James Acevedo MD    traMADoL (ULTRAM) 50 mg tablet Take 1 tablet (50 mg total) by mouth every 6 (six) hours as needed. 12 tablet 2/6/2024 -- James Acevedo MD          Follow-up Information    None          James Acevedo MD  02/06/24 1999

## 2024-02-06 NOTE — Clinical Note
"Ángel"Ángel"SadeKaterina was seen and treated in our emergency department on 2/6/2024.  He may return to work on 02/08/2024.       If you have any questions or concerns, please don't hesitate to call.      James Acevedo MD"

## 2024-05-22 ENCOUNTER — HOSPITAL ENCOUNTER (EMERGENCY)
Facility: HOSPITAL | Age: 44
Discharge: HOME | End: 2024-05-22
Attending: FAMILY MEDICINE
Payer: MEDICAID

## 2024-05-22 VITALS
HEIGHT: 73 IN | SYSTOLIC BLOOD PRESSURE: 115 MMHG | WEIGHT: 168 LBS | DIASTOLIC BLOOD PRESSURE: 70 MMHG | BODY MASS INDEX: 22.26 KG/M2 | TEMPERATURE: 98 F | HEART RATE: 100 BPM | RESPIRATION RATE: 20 BRPM | OXYGEN SATURATION: 97 %

## 2024-05-22 DIAGNOSIS — B95.8 STAPHYLOCOCCAL INFECTION: Primary | ICD-10-CM

## 2024-05-22 DIAGNOSIS — R59.0 LYMPHADENOPATHY, INGUINAL: ICD-10-CM

## 2024-05-22 PROCEDURE — 99284 EMERGENCY DEPT VISIT MOD MDM: CPT

## 2024-05-22 RX ORDER — MUPIROCIN 20 MG/G
OINTMENT TOPICAL 3 TIMES DAILY
Qty: 22 G | Refills: 0 | Status: SHIPPED | OUTPATIENT
Start: 2024-05-22 | End: 2024-06-01

## 2024-05-22 RX ORDER — CEPHALEXIN 500 MG/1
500 CAPSULE ORAL 4 TIMES DAILY
Qty: 40 CAPSULE | Refills: 0 | Status: SHIPPED | OUTPATIENT
Start: 2024-05-22 | End: 2024-06-01

## 2024-05-22 NOTE — ED PROVIDER NOTES
Encounter Date: 5/22/2024       History     Chief Complaint   Patient presents with    Abscess     Right thigh, right groin, right back of head abscesses since Sunday.  Reports popping them, scabs noted.       This patient is a 43-year-old male who comes in with 2 bumps on the back of his head, a lump in his groin and to open areas on his right hip he denies fever.    The history is provided by the patient.     Review of patient's allergies indicates:   Allergen Reactions    Camphor-methyl sal-menthol-pep Hives     Past Medical History:   Diagnosis Date    Seizure disorder     last seizure, 2017     Past Surgical History:   Procedure Laterality Date    BRAIN TUMOR EXCISION      2017     No family history on file.  Social History     Tobacco Use    Smoking status: Some Days     Types: Vaping with nicotine    Smokeless tobacco: Never    Tobacco comments:     vaps   Substance Use Topics    Alcohol use: Yes     Comment: occassional    Drug use: Never     Review of Systems   Constitutional: Negative.    HENT: Negative.     Respiratory: Negative.     Cardiovascular: Negative.    Endocrine: Negative.    Genitourinary: Negative.    Skin:         2 open areas on right hip   Neurological: Negative.    Hematological:  Positive for adenopathy.   Psychiatric/Behavioral: Negative.     All other systems reviewed and are negative.      Physical Exam     Initial Vitals [05/22/24 0737]   BP Pulse Resp Temp SpO2   115/70 100 20 98 °F (36.7 °C) 97 %      MAP       --         Physical Exam    Nursing note and vitals reviewed.  Constitutional: He appears well-developed and well-nourished.   HENT:   Head: Normocephalic.   Eyes: Pupils are equal, round, and reactive to light.   Neck:   Normal range of motion.  Cardiovascular:  Normal rate and regular rhythm.           Pulmonary/Chest: Breath sounds normal.   Abdominal: Abdomen is soft. Bowel sounds are normal.   Musculoskeletal:         General: Normal range of motion.      Cervical back:  Normal range of motion.     Lymphadenopathy:        Head (right side): Occipital adenopathy present. Inguinal adenopathy noted on the right side.   Neurological: He is alert and oriented to person, place, and time.   Skin: Skin is warm and dry.        Two small open areas on the right hip with slight erythema.   Psychiatric: He has a normal mood and affect.         ED Course   Procedures  Labs Reviewed - No data to display       Imaging Results    None          Medications - No data to display  Medical Decision Making  This patient is a 43-year-old male who comes in with 2 open areas of the skin on the right hip, right inguinal lymphadenopathy, occipital lymphadenopathy.  Differential diagnosis: Staph infection, lymphadenopathy                                      Clinical Impression:  Final diagnoses:  [B95.8] Staphylococcal infection (Primary)  [R59.0] Lymphadenopathy, inguinal          ED Disposition Condition    Discharge Stable          ED Prescriptions       Medication Sig Dispense Start Date End Date Auth. Provider    cephALEXin (KEFLEX) 500 MG capsule Take 1 capsule (500 mg total) by mouth 4 (four) times daily. for 10 days 40 capsule 5/22/2024 6/1/2024 James Acevedo MD    mupirocin (BACTROBAN) 2 % ointment Apply topically 3 (three) times daily. for 10 days 22 g 5/22/2024 6/1/2024 James Acevedo MD          Follow-up Information    None          James Acevedo MD  05/22/24 5642     No deformity or limitation of movement

## 2024-06-17 ENCOUNTER — HOSPITAL ENCOUNTER (INPATIENT)
Facility: HOSPITAL | Age: 44
LOS: 2 days | Discharge: HOME | DRG: 516 | End: 2024-06-19
Attending: EMERGENCY MEDICINE | Admitting: STUDENT IN AN ORGANIZED HEALTH CARE EDUCATION/TRAINING PROGRAM
Payer: MEDICAID

## 2024-06-17 ENCOUNTER — ANESTHESIA EVENT (OUTPATIENT)
Dept: SURGERY | Facility: HOSPITAL | Age: 44
End: 2024-06-17
Payer: MEDICAID

## 2024-06-17 ENCOUNTER — HOSPITAL ENCOUNTER (EMERGENCY)
Facility: HOSPITAL | Age: 44
Discharge: ANOTHER HOSPITAL | End: 2024-06-17
Attending: STUDENT IN AN ORGANIZED HEALTH CARE EDUCATION/TRAINING PROGRAM
Payer: MEDICAID

## 2024-06-17 VITALS
SYSTOLIC BLOOD PRESSURE: 121 MMHG | RESPIRATION RATE: 18 BRPM | HEART RATE: 87 BPM | HEIGHT: 72 IN | OXYGEN SATURATION: 96 % | WEIGHT: 162 LBS | BODY MASS INDEX: 21.94 KG/M2 | TEMPERATURE: 98 F | DIASTOLIC BLOOD PRESSURE: 73 MMHG

## 2024-06-17 DIAGNOSIS — S32.82XA MULTIPLE CLOSED FRACTURES OF PELVIS WITHOUT DISRUPTION OF PELVIC RING, INITIAL ENCOUNTER: Primary | ICD-10-CM

## 2024-06-17 DIAGNOSIS — S32.810A CLOSED PELVIC RING FRACTURE, INITIAL ENCOUNTER: Primary | ICD-10-CM

## 2024-06-17 DIAGNOSIS — S32.2XXA CLOSED FRACTURE OF SACRUM AND COCCYX, INITIAL ENCOUNTER: ICD-10-CM

## 2024-06-17 DIAGNOSIS — S32.10XA CLOSED FRACTURE OF SACRUM AND COCCYX, INITIAL ENCOUNTER: ICD-10-CM

## 2024-06-17 DIAGNOSIS — S32.592A PUBIC RAMUS FRACTURE, LEFT, CLOSED, INITIAL ENCOUNTER: ICD-10-CM

## 2024-06-17 LAB
ALBUMIN SERPL-MCNC: 4.5 G/DL (ref 3.5–5)
ALBUMIN/GLOB SERPL: 1.3 RATIO (ref 1.1–2)
ALP SERPL-CCNC: 59 UNIT/L (ref 40–150)
ALT SERPL-CCNC: 27 UNIT/L (ref 0–55)
AMPHET UR QL SCN: NEGATIVE
ANION GAP SERPL CALC-SCNC: 10 MEQ/L
APTT PPP: 21.9 SECONDS (ref 21.4–28.3)
AST SERPL-CCNC: 33 UNIT/L (ref 5–34)
BACTERIA #/AREA URNS AUTO: ABNORMAL /HPF
BARBITURATE SCN PRESENT UR: NEGATIVE
BASOPHILS # BLD AUTO: 0.03 X10(3)/MCL
BASOPHILS NFR BLD AUTO: 0.4 %
BENZODIAZ UR QL SCN: NEGATIVE
BILIRUB SERPL-MCNC: 0.8 MG/DL
BILIRUB UR QL STRIP.AUTO: ABNORMAL
BUN SERPL-MCNC: 14 MG/DL (ref 8.9–20.6)
CALCIUM SERPL-MCNC: 9.9 MG/DL (ref 8.4–10.2)
CANNABINOIDS UR QL SCN: NEGATIVE
CHLORIDE SERPL-SCNC: 105 MMOL/L (ref 98–107)
CLARITY UR: CLEAR
CO2 SERPL-SCNC: 26 MMOL/L (ref 22–29)
COCAINE UR QL SCN: NEGATIVE
COLOR UR AUTO: YELLOW
CREAT SERPL-MCNC: 1.38 MG/DL (ref 0.73–1.18)
CREAT/UREA NIT SERPL: 10
EOSINOPHIL # BLD AUTO: 0.15 X10(3)/MCL (ref 0–0.9)
EOSINOPHIL NFR BLD AUTO: 2.1 %
ERYTHROCYTE [DISTWIDTH] IN BLOOD BY AUTOMATED COUNT: 12.3 % (ref 11.5–17)
FENTANYL UR QL SCN: NEGATIVE
GFR SERPLBLD CREATININE-BSD FMLA CKD-EPI: >60 ML/MIN/1.73/M2
GLOBULIN SER-MCNC: 3.4 GM/DL (ref 2.4–3.5)
GLUCOSE SERPL-MCNC: 99 MG/DL (ref 74–100)
GLUCOSE UR QL STRIP: NEGATIVE
GROUP & RH: NORMAL
HCT VFR BLD AUTO: 45.1 % (ref 42–52)
HGB BLD-MCNC: 15.9 G/DL (ref 14–18)
HGB UR QL STRIP: ABNORMAL
IMM GRANULOCYTES # BLD AUTO: 0.08 X10(3)/MCL (ref 0–0.04)
IMM GRANULOCYTES NFR BLD AUTO: 1.1 %
INDIRECT COOMBS: NORMAL
INR PPP: 1
KETONES UR QL STRIP: ABNORMAL
LACTATE SERPL-SCNC: 1.1 MMOL/L (ref 0.5–2.2)
LEUKOCYTE ESTERASE UR QL STRIP: NEGATIVE
LYMPHOCYTES # BLD AUTO: 1.99 X10(3)/MCL (ref 0.6–4.6)
LYMPHOCYTES NFR BLD AUTO: 28 %
MCH RBC QN AUTO: 30.9 PG (ref 27–31)
MCHC RBC AUTO-ENTMCNC: 35.3 G/DL (ref 33–36)
MCV RBC AUTO: 87.7 FL (ref 80–94)
MDMA UR QL SCN: NEGATIVE
MONOCYTES # BLD AUTO: 0.4 X10(3)/MCL (ref 0.1–1.3)
MONOCYTES NFR BLD AUTO: 5.6 %
NEUTROPHILS # BLD AUTO: 4.46 X10(3)/MCL (ref 2.1–9.2)
NEUTROPHILS NFR BLD AUTO: 62.8 %
NITRITE UR QL STRIP: NEGATIVE
NRBC BLD AUTO-RTO: 0 %
OPIATES UR QL SCN: POSITIVE
PCP UR QL: NEGATIVE
PH UR STRIP: 6 [PH]
PH UR: 6 [PH] (ref 3–11)
PLATELET # BLD AUTO: 205 X10(3)/MCL (ref 130–400)
PMV BLD AUTO: 9.9 FL (ref 7.4–10.4)
POTASSIUM SERPL-SCNC: 4 MMOL/L (ref 3.5–5.1)
PROT SERPL-MCNC: 7.9 GM/DL (ref 6.4–8.3)
PROT UR QL STRIP: ABNORMAL
PROTHROMBIN TIME: 10.6 SECONDS (ref 9.1–10.9)
RBC # BLD AUTO: 5.14 X10(6)/MCL (ref 4.7–6.1)
RBC #/AREA URNS AUTO: ABNORMAL /HPF
SODIUM SERPL-SCNC: 141 MMOL/L (ref 136–145)
SP GR UR STRIP.AUTO: 1.02 (ref 1–1.03)
SPECIFIC GRAVITY, URINE AUTO (.000) (OHS): 1.02 (ref 1–1.03)
SPECIMEN OUTDATE: NORMAL
SQUAMOUS #/AREA URNS AUTO: ABNORMAL /HPF
UROBILINOGEN UR STRIP-ACNC: 0.2
WBC # BLD AUTO: 7.11 X10(3)/MCL (ref 4.5–11.5)
WBC #/AREA URNS AUTO: ABNORMAL /HPF

## 2024-06-17 PROCEDURE — 63600175 PHARM REV CODE 636 W HCPCS: Performed by: EMERGENCY MEDICINE

## 2024-06-17 PROCEDURE — 83605 ASSAY OF LACTIC ACID: CPT | Performed by: NURSE PRACTITIONER

## 2024-06-17 PROCEDURE — 63600175 PHARM REV CODE 636 W HCPCS: Performed by: STUDENT IN AN ORGANIZED HEALTH CARE EDUCATION/TRAINING PROGRAM

## 2024-06-17 PROCEDURE — 85025 COMPLETE CBC W/AUTO DIFF WBC: CPT | Performed by: STUDENT IN AN ORGANIZED HEALTH CARE EDUCATION/TRAINING PROGRAM

## 2024-06-17 PROCEDURE — 63600175 PHARM REV CODE 636 W HCPCS: Performed by: NURSE PRACTITIONER

## 2024-06-17 PROCEDURE — 96374 THER/PROPH/DIAG INJ IV PUSH: CPT | Mod: 59

## 2024-06-17 PROCEDURE — 85730 THROMBOPLASTIN TIME PARTIAL: CPT | Performed by: STUDENT IN AN ORGANIZED HEALTH CARE EDUCATION/TRAINING PROGRAM

## 2024-06-17 PROCEDURE — 99291 CRITICAL CARE FIRST HOUR: CPT

## 2024-06-17 PROCEDURE — 80053 COMPREHEN METABOLIC PANEL: CPT | Performed by: STUDENT IN AN ORGANIZED HEALTH CARE EDUCATION/TRAINING PROGRAM

## 2024-06-17 PROCEDURE — 25000003 PHARM REV CODE 250: Performed by: NURSE PRACTITIONER

## 2024-06-17 PROCEDURE — 96374 THER/PROPH/DIAG INJ IV PUSH: CPT

## 2024-06-17 PROCEDURE — 99285 EMERGENCY DEPT VISIT HI MDM: CPT | Mod: 25

## 2024-06-17 PROCEDURE — 25500020 PHARM REV CODE 255: Performed by: STUDENT IN AN ORGANIZED HEALTH CARE EDUCATION/TRAINING PROGRAM

## 2024-06-17 PROCEDURE — 96376 TX/PRO/DX INJ SAME DRUG ADON: CPT

## 2024-06-17 PROCEDURE — 85610 PROTHROMBIN TIME: CPT | Performed by: STUDENT IN AN ORGANIZED HEALTH CARE EDUCATION/TRAINING PROGRAM

## 2024-06-17 PROCEDURE — 11000001 HC ACUTE MED/SURG PRIVATE ROOM

## 2024-06-17 PROCEDURE — 81003 URINALYSIS AUTO W/O SCOPE: CPT | Performed by: STUDENT IN AN ORGANIZED HEALTH CARE EDUCATION/TRAINING PROGRAM

## 2024-06-17 PROCEDURE — 80307 DRUG TEST PRSMV CHEM ANLYZR: CPT | Performed by: STUDENT IN AN ORGANIZED HEALTH CARE EDUCATION/TRAINING PROGRAM

## 2024-06-17 PROCEDURE — 86900 BLOOD TYPING SEROLOGIC ABO: CPT | Performed by: STUDENT IN AN ORGANIZED HEALTH CARE EDUCATION/TRAINING PROGRAM

## 2024-06-17 PROCEDURE — 96361 HYDRATE IV INFUSION ADD-ON: CPT

## 2024-06-17 PROCEDURE — 25000003 PHARM REV CODE 250: Performed by: STUDENT IN AN ORGANIZED HEALTH CARE EDUCATION/TRAINING PROGRAM

## 2024-06-17 PROCEDURE — 86850 RBC ANTIBODY SCREEN: CPT | Performed by: STUDENT IN AN ORGANIZED HEALTH CARE EDUCATION/TRAINING PROGRAM

## 2024-06-17 RX ORDER — DOCUSATE SODIUM 100 MG/1
100 CAPSULE, LIQUID FILLED ORAL 2 TIMES DAILY
Status: DISCONTINUED | OUTPATIENT
Start: 2024-06-17 | End: 2024-06-19 | Stop reason: HOSPADM

## 2024-06-17 RX ORDER — MORPHINE SULFATE 2 MG/ML
4 INJECTION, SOLUTION INTRAMUSCULAR; INTRAVENOUS
Status: COMPLETED | OUTPATIENT
Start: 2024-06-17 | End: 2024-06-17

## 2024-06-17 RX ORDER — ENOXAPARIN SODIUM 100 MG/ML
40 INJECTION SUBCUTANEOUS EVERY 12 HOURS
Status: DISCONTINUED | OUTPATIENT
Start: 2024-06-17 | End: 2024-06-19 | Stop reason: HOSPADM

## 2024-06-17 RX ORDER — ACETAMINOPHEN 325 MG/1
650 TABLET ORAL EVERY 4 HOURS
Status: DISCONTINUED | OUTPATIENT
Start: 2024-06-17 | End: 2024-06-19 | Stop reason: HOSPADM

## 2024-06-17 RX ORDER — OXYCODONE HYDROCHLORIDE 10 MG/1
10 TABLET ORAL EVERY 4 HOURS PRN
Status: DISCONTINUED | OUTPATIENT
Start: 2024-06-17 | End: 2024-06-19 | Stop reason: HOSPADM

## 2024-06-17 RX ORDER — ADHESIVE BANDAGE
30 BANDAGE TOPICAL DAILY PRN
Status: DISCONTINUED | OUTPATIENT
Start: 2024-06-17 | End: 2024-06-19 | Stop reason: HOSPADM

## 2024-06-17 RX ORDER — METHOCARBAMOL 500 MG/1
500 TABLET, FILM COATED ORAL EVERY 8 HOURS
Status: DISCONTINUED | OUTPATIENT
Start: 2024-06-17 | End: 2024-06-19 | Stop reason: HOSPADM

## 2024-06-17 RX ORDER — SODIUM CHLORIDE, SODIUM LACTATE, POTASSIUM CHLORIDE, CALCIUM CHLORIDE 600; 310; 30; 20 MG/100ML; MG/100ML; MG/100ML; MG/100ML
INJECTION, SOLUTION INTRAVENOUS CONTINUOUS
OUTPATIENT
Start: 2024-06-17

## 2024-06-17 RX ORDER — OXYCODONE HYDROCHLORIDE 5 MG/1
5 TABLET ORAL EVERY 4 HOURS PRN
Status: DISCONTINUED | OUTPATIENT
Start: 2024-06-17 | End: 2024-06-19 | Stop reason: HOSPADM

## 2024-06-17 RX ORDER — SODIUM CHLORIDE, SODIUM LACTATE, POTASSIUM CHLORIDE, CALCIUM CHLORIDE 600; 310; 30; 20 MG/100ML; MG/100ML; MG/100ML; MG/100ML
INJECTION, SOLUTION INTRAVENOUS CONTINUOUS
Status: DISCONTINUED | OUTPATIENT
Start: 2024-06-17 | End: 2024-06-18

## 2024-06-17 RX ORDER — POLYETHYLENE GLYCOL 3350 17 G/17G
17 POWDER, FOR SOLUTION ORAL 2 TIMES DAILY
Status: DISCONTINUED | OUTPATIENT
Start: 2024-06-17 | End: 2024-06-19 | Stop reason: HOSPADM

## 2024-06-17 RX ORDER — SODIUM CHLORIDE 9 MG/ML
1000 INJECTION, SOLUTION INTRAVENOUS
Status: COMPLETED | OUTPATIENT
Start: 2024-06-17 | End: 2024-06-17

## 2024-06-17 RX ORDER — LIDOCAINE HYDROCHLORIDE 10 MG/ML
1 INJECTION, SOLUTION EPIDURAL; INFILTRATION; INTRACAUDAL; PERINEURAL ONCE
OUTPATIENT
Start: 2024-06-17 | End: 2024-06-17

## 2024-06-17 RX ORDER — TALC
6 POWDER (GRAM) TOPICAL NIGHTLY PRN
Status: DISCONTINUED | OUTPATIENT
Start: 2024-06-17 | End: 2024-06-19 | Stop reason: HOSPADM

## 2024-06-17 RX ORDER — FENTANYL CITRATE 50 UG/ML
100 INJECTION, SOLUTION INTRAMUSCULAR; INTRAVENOUS
Status: COMPLETED | OUTPATIENT
Start: 2024-06-17 | End: 2024-06-17

## 2024-06-17 RX ORDER — GABAPENTIN 300 MG/1
300 CAPSULE ORAL 3 TIMES DAILY
Status: DISCONTINUED | OUTPATIENT
Start: 2024-06-17 | End: 2024-06-19 | Stop reason: HOSPADM

## 2024-06-17 RX ADMIN — METHOCARBAMOL 500 MG: 500 TABLET ORAL at 09:06

## 2024-06-17 RX ADMIN — MORPHINE SULFATE 4 MG: 2 INJECTION, SOLUTION INTRAMUSCULAR; INTRAVENOUS at 03:06

## 2024-06-17 RX ADMIN — MORPHINE SULFATE 4 MG: 2 INJECTION, SOLUTION INTRAMUSCULAR; INTRAVENOUS at 04:06

## 2024-06-17 RX ADMIN — SODIUM CHLORIDE 1000 ML: 9 INJECTION, SOLUTION INTRAVENOUS at 03:06

## 2024-06-17 RX ADMIN — GABAPENTIN 300 MG: 300 CAPSULE ORAL at 09:06

## 2024-06-17 RX ADMIN — OXYCODONE HYDROCHLORIDE 10 MG: 10 TABLET ORAL at 11:06

## 2024-06-17 RX ADMIN — SODIUM CHLORIDE 1000 ML: 9 INJECTION, SOLUTION INTRAVENOUS at 04:06

## 2024-06-17 RX ADMIN — SODIUM CHLORIDE, POTASSIUM CHLORIDE, SODIUM LACTATE AND CALCIUM CHLORIDE: 600; 310; 30; 20 INJECTION, SOLUTION INTRAVENOUS at 09:06

## 2024-06-17 RX ADMIN — DOCUSATE SODIUM 100 MG: 100 CAPSULE, LIQUID FILLED ORAL at 09:06

## 2024-06-17 RX ADMIN — ENOXAPARIN SODIUM 40 MG: 40 INJECTION SUBCUTANEOUS at 09:06

## 2024-06-17 RX ADMIN — FENTANYL CITRATE 100 MCG: 50 INJECTION, SOLUTION INTRAMUSCULAR; INTRAVENOUS at 07:06

## 2024-06-17 RX ADMIN — IOPAMIDOL 100 ML: 755 INJECTION, SOLUTION INTRAVENOUS at 03:06

## 2024-06-17 RX ADMIN — ACETAMINOPHEN 650 MG: 325 TABLET, FILM COATED ORAL at 09:06

## 2024-06-17 RX ADMIN — SODIUM CHLORIDE, POTASSIUM CHLORIDE, SODIUM LACTATE AND CALCIUM CHLORIDE 1000 ML: 600; 310; 30; 20 INJECTION, SOLUTION INTRAVENOUS at 07:06

## 2024-06-17 NOTE — ED NOTES
Pt was brought to room 2 via wheel chair. Pt states he fell off a ladder at his home and landed on the ground. Pt c/o bilateral hip pain/bruising and left leg pain. Pt does have a lump on the right side of his forehead, denies LOC. MD noted pin point pupils and reactive. Pt is AAOx4.

## 2024-06-17 NOTE — ED PROVIDER NOTES
Encounter Date: 6/17/2024       History     Chief Complaint   Patient presents with    Fall     Traumatic fall from 10 foot ladder  landing on his legs with pain to left hip and leg pain LOP 10/10. Able to weight bear on right leg, unable to move left leg. Peripheral pulses palpable and strong. Bruising noted to bilateral hips. Patient also c/o right rib pain. C- collar placed on.     Groin Injury     Patient is a 43-year-old white gentleman with a history of brain neoplasm status post excision in his seizure disorder who presented to the ER today after fall from a ladder.  Patient states he was doing work on a house was on a scaffold being ladder which is greater than 10 ft per patient.  He was unsure of the exact tight.  He states he fell onto his right hip and has been writhing in pain since.  Time of injuries prior to arrival proximally 30 minutes.  Came in via POV.  He does endorse a headache, neck pain, back pain and hip pain bilaterally.  He denies any upper extremity pain or lower extremity pain.  Denies any other injuries.  Denies any drug use.      Review of patient's allergies indicates:   Allergen Reactions    Camphor-methyl sal-menthol-pep Hives     Past Medical History:   Diagnosis Date    Seizure disorder     last seizure, 2017     Past Surgical History:   Procedure Laterality Date    BRAIN TUMOR EXCISION      2017     No family history on file.  Social History     Tobacco Use    Smoking status: Some Days     Types: Vaping with nicotine    Smokeless tobacco: Never    Tobacco comments:     vaps   Substance Use Topics    Alcohol use: Yes     Comment: occassional    Drug use: Never     Review of Systems   Constitutional:  Negative for chills, fatigue and fever.   HENT:  Negative for congestion, sore throat and trouble swallowing.    Eyes:  Negative for pain and visual disturbance.   Respiratory:  Negative for cough, shortness of breath and wheezing.    Cardiovascular:  Negative for chest pain and  palpitations.   Gastrointestinal:  Negative for abdominal pain, blood in stool, constipation, diarrhea, nausea and vomiting.   Genitourinary:  Negative for dysuria and hematuria.   Musculoskeletal:  Positive for arthralgias, back pain, neck pain and neck stiffness. Negative for myalgias.   Skin:  Negative for rash and wound.   Neurological:  Negative for seizures, syncope and headaches.   Psychiatric/Behavioral:  Negative for confusion. The patient is not nervous/anxious.        Physical Exam     Initial Vitals [06/17/24 1525]   BP Pulse Resp Temp SpO2   96/71 68 20 98.1 °F (36.7 °C) 98 %      MAP       --         Physical Exam    Nursing note and vitals reviewed.  Constitutional: He appears well-developed and well-nourished. No distress.   HENT:   Head: Normocephalic and atraumatic.   Eyes: Conjunctivae and EOM are normal. Right eye exhibits no discharge. Left eye exhibits no discharge. No scleral icterus.   Neck: No tracheal deviation present.   Normal range of motion.  Cardiovascular:  Normal rate, regular rhythm and normal heart sounds.     Exam reveals no gallop and no friction rub.       No murmur heard.  Pulmonary/Chest: Breath sounds normal. No respiratory distress. He has no wheezes. He has no rhonchi. He has no rales.   Abdominal: Abdomen is soft. He exhibits no distension. There is no abdominal tenderness. There is no rebound and no guarding.   Musculoskeletal:         General: No edema. Normal range of motion.      Cervical back: Normal range of motion.     Neurological: He is alert.   GCS 15.  Head normocephalic.  There is an area of erythema and slight soft tissue swelling noted to the right side of the forehead.  C-collar in place on arrival.  No chest wall tenderness or or signs of trauma on exam.  Bilateral breath sounds appreciated.  No flail chest appreciated.  Abdominal exam unremarkable for ecchymosis or signs of trauma.  Abdomen is soft without signs of acute surgical abdomen.  There is notable  bruising over the right greater trochanter.  No leg-length discrepancies and DP pulses appreciated bilaterally.  No fixed rotation of the legs bilaterally.  Upper extremities move without tenderness or deformity.  No tenderness over the clavicles bilaterally.  PERRLA.  Mydriasis appreciated on exam.  Poor dentition throughout.   Skin: Skin is warm and dry. No rash and no abscess noted. No erythema. No pallor.   Psychiatric: His behavior is normal. Judgment normal.         ED Course   Critical Care    Date/Time: 6/17/2024 4:50 PM    Performed by: Maikol Zavala MD  Authorized by: Maikol Zavala MD  Direct patient critical care time: 10 minutes  Additional history critical care time: 10 minutes  Ordering / reviewing critical care time: 10 minutes  Documentation critical care time: 10 minutes  Consulting other physicians critical care time: 10 minutes  Total critical care time (exclusive of procedural time) : 50 minutes  Critical care was necessary to treat or prevent imminent or life-threatening deterioration of the following conditions: trauma.  Critical care was time spent personally by me on the following activities: blood draw for specimens, development of treatment plan with patient or surrogate, interpretation of cardiac output measurements, evaluation of patient's response to treatment, examination of patient, obtaining history from patient or surrogate, ordering and performing treatments and interventions, ordering and review of laboratory studies, pulse oximetry, ordering and review of radiographic studies, re-evaluation of patient's condition and review of old charts.        Labs Reviewed   COMPREHENSIVE METABOLIC PANEL - Abnormal; Notable for the following components:       Result Value    Creatinine 1.38 (*)     All other components within normal limits   URINALYSIS, REFLEX TO URINE CULTURE - Abnormal; Notable for the following components:    Protein, UA Trace (*)     Ketones, UA Trace (*)     Blood, UA 3+  (*)     Bilirubin, UA 1+ (*)     All other components within normal limits   CBC WITH DIFFERENTIAL - Abnormal; Notable for the following components:    IG# 0.08 (*)     All other components within normal limits   DRUG SCREEN, URINE (BEAKER) - Abnormal; Notable for the following components:    Opiates, Urine Positive (*)     All other components within normal limits    Narrative:     Cut off concentrations:    Amphetamines - 1000 ng/ml  Barbiturates - 200 ng/ml  Benzodiazepine - 200 ng/ml  Cannabinoids (THC) - 50 ng/ml  Cocaine - 300 ng/ml  Fentanyl - 1.0 ng/ml  MDMA - 500 ng/ml  Opiates - 300 ng/ml   Phencyclidine (PCP) - 25 ng/ml    Specimen submitted for drug analysis and tested for pH and specific gravity in order to evaluate sample integrity. Suspect tampering if specific gravity is <1.003 and/or pH is not within the range of 4.5 - 8.0  False negatives may result form substances such as bleach added to urine.  False positives may result for the presence of a substance with similar chemical structure to the drug or its metabolite.    This test provides only a PRELIMINARY analytical test result. A more specific alternate chemical method must be used in order to obtain a confirmed analytical result. Gas chromatography/mass spectrometry (GC/MS) is the preferred confirmatory method. Other chemical confirmation methods are available. Clinical consideration and professional judgement should be applied to any drug of abuse test result, particularly when preliminary positive results are used.    Positive results will be confirmed only at the physicians request. Unconfirmed screening results are to be used only for medical purposes (treatment).        URINALYSIS, MICROSCOPIC - Abnormal; Notable for the following components:    RBC, UA 11-20 (*)     Squamous Epithelial Cells, UA Few (*)     All other components within normal limits   PROTIME-INR - Normal   APTT - Normal   CBC W/ AUTO DIFFERENTIAL    Narrative:     The  following orders were created for panel order CBC Auto Differential.  Procedure                               Abnormality         Status                     ---------                               -----------         ------                     CBC with Differential[[id number]]       Abnormal            Final result                 Please view results for these tests on the individual orders.   TYPE & SCREEN          Imaging Results              CT Chest Abdomen Pelvis With IV Contrast (XPD) Routine Oral Contrast (Final result)  Result time 06/17/24 16:20:37      Final result by Sameer Pool MD (06/17/24 16:20:37)                   Impression:      1.  No traumatic injury of the thoraxidentified.    2.  Fracture right sacral ala and the left pubic bone junction with the inferior pubic ramus.      Electronically signed by: Sameer Pool  Date:    06/17/2024  Time:    16:20               Narrative:    EXAMINATION:  CT CHEST ABDOMEN PELVIS WITH IV CONTRAST (XPD)    CLINICAL HISTORY:  trauma;    TECHNIQUE:  Multidetector axial images were obtained from the thoracic inlet through the greater trochanters following the administration of IV contrast.    Dose length product of 673 mGycm. Automated exposure control was utilized to minimize radiation dose.    COMPARISON:  CT abdomen pelvis May 27, 2019.    CHEST FINDINGS:    The lungs are unremarkable without suspicious soft tissue pulmonary nodule, parenchyma consolidation, interstitial disease, pleural effusion or pneumothorax.  By lungs dependent hypoventilatory changes.    Images are partially degraded by respiratory misregistration. No traumatic finding of the thoracic great vessels identified and there are no dominant mediastinal hematomas. Thoracic spine alignment is preserved. No consistent findings reflective of a displaced fracture.    ABDOMINAL FINDINGS:    There is no abdominal solid parenchymal organs traumatic damage with unremarkable attenuation of the liver,  pancreas and spleen. Gallbladder wall is not thickened and there is no intra luminal calcified calculus.    The adrenal glands size and configuration is within normal limits. Kidneys are symmetric in size and exhibit symmetric contrast enhancement. No renal contusion or laceration identified. There is no hydronephrosis or perinephric fluid collection. The abdominal aorta is normal in course and diameter. No retroperitoneal hematoma. There is no extra luminal air. No focal bowel wall thickening or free fluid identified. Lumbar alignment is preserved.    PELVIC FINDINGS:    There is no free fluid. Urinary bladder appears within normal limits without wall thickening. No evidence for bladder rupture.  Prostate is enlarged in size.    There is fracture right sacral ala with mild extension into the sacroiliac joint seen on image 63 series 10 and image 176 series 2.  There is also fracture deformity at the junction of the left pubic bone and the inferior pubic ramus on image 210 series 2.  There are adjacent small displaced bony fragments versus soft tissue calcification image 210 series 2.  There are regional soft tissue inflammations.  There is no diastasis of the symphysis pubis                                       CT Head Without Contrast (Final result)  Result time 06/17/24 16:06:59      Final result by Joy Rogers MD (06/17/24 16:06:59)                   Impression:      No acute intracranial abnormality.      Electronically signed by: Joy Rogers  Date:    06/17/2024  Time:    16:06               Narrative:    EXAMINATION:  CT HEAD WITHOUT CONTRAST    CLINICAL HISTORY:  trauma;    TECHNIQUE:  Axial scans were obtained from skull base to the vertex.    Coronal and sagittal reconstructions obtained from the axial data.    Automatic exposure control was utilized to limit radiation dose.    Contrast: None    Radiation Dose:    Total DLP: 2125 mGy*cm    COMPARISON:  CT head dated  02/06/2024    FINDINGS:  There is no acute intracranial hemorrhage or edema.  There is encephalomalacia in the left frontal lobe.    There is no mass effect or midline shift.  There is parenchymal volume loss with ex vacuo dilatation of the left lateral ventricle.  The basal cisterns are patent. There is no abnormal extra-axial fluid collection.    There are postoperative changes of the left calvarium.  There is mild scattered paranasal sinus mucosal thickening.                                       CT Cervical Spine Without Contrast (Final result)  Result time 06/17/24 16:09:42      Final result by Red Frederick MD (06/17/24 16:09:42)                   Impression:      Degenerative changes in the cervical spine otherwise unremarkable      Electronically signed by: Desmond Frederick  Date:    06/17/2024  Time:    16:09               Narrative:    EXAMINATION:  CT CERVICAL SPINE WITHOUT CONTRAST    CLINICAL HISTORY:  trauma. fall from ladder 15-20ft;    TECHNIQUE:  Low dose axial images, sagittal and coronal reformations were performed though the cervical spine.  Contrast was not administered. Automatic exposure control is utilized to reduce patient radiation exposure.    COMPARISON:  None    FINDINGS:  The vertebral body heights are well maintained.  The alignment is well maintained.  The bones are  osteoporotic.  There are degenerative changes seen throughout the cervical spine.  No evidence of acute fracture is seen.  No dislocation is seen.  Odontoid lateral masses appear grossly unremarkable.  No soft tissue abnormality is seen.  No retropharyngeal abnormality is seen.  Visualized portions of the airway appear grossly unremarkable.  Visualized portion of the thoracic inlet appears unremarkable                                       Medications   morphine injection 4 mg (4 mg Intravenous Given 6/17/24 1535)   0.9%  NaCl infusion (1,000 mLs Intravenous New Bag 6/17/24 1537)   iopamidoL (ISOVUE-370) injection  100 mL (100 mLs Intravenous Given 6/17/24 6314)   morphine injection 4 mg (4 mg Intravenous Given 6/17/24 2751)     Medical Decision Making  Differentials:  Trauma, pelvic fracture, intracranial bleed, spine injury   Historian is the patient and his friend   43-year-old male GCS of 15 protecting his airway with stable vital signs presents with pain diffusely in his pelvis.  No obvious leg-length discrepancies or deformities on exam.  Concern for pelvic fracture in dangerous mechanism of injury.  Bed sheet used for pelvic binder.  C-collar in place.  Pulses x4 appreciated.  HD stable.  Morphine given for pain.  Labs noncontributory.  CT head unremarkable for acute injuries.  C spine negative CT.  C collar removed.  CT chest/ab showed multiple pelvis fractures.  No indication of hemorrhage at this time.  Spoke with Dr. Bell at [facility] who accepted for transfer.      Amount and/or Complexity of Data Reviewed  Labs: ordered. Decision-making details documented in ED Course.  Radiology: ordered. Decision-making details documented in ED Course.    Risk  Prescription drug management.                                      Clinical Impression:  Final diagnoses:  [S32.82XA] Multiple closed fractures of pelvis without disruption of pelvic ring, initial encounter (Primary)          ED Disposition Condition    Transfer to Another Facility Stable                Maikol Zavala MD  06/17/24 5821

## 2024-06-17 NOTE — ED NOTES
[facility] arrived and transported pt to [facility]. EMS was given bag of clothes and electronics with pts records/disc.

## 2024-06-18 ENCOUNTER — ANESTHESIA (OUTPATIENT)
Dept: SURGERY | Facility: HOSPITAL | Age: 44
End: 2024-06-18
Payer: MEDICAID
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
## 2024-06-18 PROBLEM — S32.810A CLOSED PELVIC RING FRACTURE: Status: ACTIVE | Noted: 2024-06-18

## 2024-06-18 PROBLEM — S32.9XXA: Status: ACTIVE | Noted: 2024-06-18

## 2024-06-18 LAB
ALBUMIN SERPL-MCNC: 3 G/DL (ref 3.5–5)
ALBUMIN/GLOB SERPL: 1.4 RATIO (ref 1.1–2)
ALP SERPL-CCNC: 39 UNIT/L (ref 40–150)
ALT SERPL-CCNC: 19 UNIT/L (ref 0–55)
ANION GAP SERPL CALC-SCNC: 5 MEQ/L
AST SERPL-CCNC: 20 UNIT/L (ref 5–34)
BASOPHILS # BLD AUTO: 0.03 X10(3)/MCL
BASOPHILS NFR BLD AUTO: 0.3 %
BILIRUB SERPL-MCNC: 0.6 MG/DL
BUN SERPL-MCNC: 11.5 MG/DL (ref 8.9–20.6)
CALCIUM SERPL-MCNC: 8.2 MG/DL (ref 8.4–10.2)
CHLORIDE SERPL-SCNC: 106 MMOL/L (ref 98–107)
CO2 SERPL-SCNC: 26 MMOL/L (ref 22–29)
CREAT SERPL-MCNC: 0.93 MG/DL (ref 0.73–1.18)
CREAT/UREA NIT SERPL: 12
EOSINOPHIL # BLD AUTO: 0.11 X10(3)/MCL (ref 0–0.9)
EOSINOPHIL NFR BLD AUTO: 1.3 %
ERYTHROCYTE [DISTWIDTH] IN BLOOD BY AUTOMATED COUNT: 12.9 % (ref 11.5–17)
GFR SERPLBLD CREATININE-BSD FMLA CKD-EPI: >60 ML/MIN/1.73/M2
GLOBULIN SER-MCNC: 2.2 GM/DL (ref 2.4–3.5)
GLUCOSE SERPL-MCNC: 95 MG/DL (ref 74–100)
HCT VFR BLD AUTO: 38.7 % (ref 42–52)
HGB BLD-MCNC: 13 G/DL (ref 14–18)
IMM GRANULOCYTES # BLD AUTO: 0.03 X10(3)/MCL (ref 0–0.04)
IMM GRANULOCYTES NFR BLD AUTO: 0.3 %
LYMPHOCYTES # BLD AUTO: 1.46 X10(3)/MCL (ref 0.6–4.6)
LYMPHOCYTES NFR BLD AUTO: 16.9 %
MAGNESIUM SERPL-MCNC: 1.8 MG/DL (ref 1.6–2.6)
MCH RBC QN AUTO: 30.7 PG (ref 27–31)
MCHC RBC AUTO-ENTMCNC: 33.6 G/DL (ref 33–36)
MCV RBC AUTO: 91.5 FL (ref 80–94)
MONOCYTES # BLD AUTO: 0.63 X10(3)/MCL (ref 0.1–1.3)
MONOCYTES NFR BLD AUTO: 7.3 %
NEUTROPHILS # BLD AUTO: 6.37 X10(3)/MCL (ref 2.1–9.2)
NEUTROPHILS NFR BLD AUTO: 73.9 %
NRBC BLD AUTO-RTO: 0 %
PHOSPHATE SERPL-MCNC: 3.7 MG/DL (ref 2.3–4.7)
PLATELET # BLD AUTO: 133 X10(3)/MCL (ref 130–400)
PLATELETS.RETICULATED NFR BLD AUTO: 5.4 % (ref 0.9–11.2)
PMV BLD AUTO: 10.8 FL (ref 7.4–10.4)
POTASSIUM SERPL-SCNC: 3.6 MMOL/L (ref 3.5–5.1)
PROT SERPL-MCNC: 5.2 GM/DL (ref 6.4–8.3)
RBC # BLD AUTO: 4.23 X10(6)/MCL (ref 4.7–6.1)
SODIUM SERPL-SCNC: 137 MMOL/L (ref 136–145)
WBC # BLD AUTO: 8.63 X10(3)/MCL (ref 4.5–11.5)

## 2024-06-18 PROCEDURE — 36000710: Performed by: ORTHOPAEDIC SURGERY

## 2024-06-18 PROCEDURE — 37000009 HC ANESTHESIA EA ADD 15 MINS: Performed by: ORTHOPAEDIC SURGERY

## 2024-06-18 PROCEDURE — 80053 COMPREHEN METABOLIC PANEL: CPT | Performed by: NURSE PRACTITIONER

## 2024-06-18 PROCEDURE — 27201423 OPTIME MED/SURG SUP & DEVICES STERILE SUPPLY: Performed by: ORTHOPAEDIC SURGERY

## 2024-06-18 PROCEDURE — 83735 ASSAY OF MAGNESIUM: CPT | Performed by: NURSE PRACTITIONER

## 2024-06-18 PROCEDURE — 25000003 PHARM REV CODE 250: Performed by: NURSE ANESTHETIST, CERTIFIED REGISTERED

## 2024-06-18 PROCEDURE — 63600175 PHARM REV CODE 636 W HCPCS: Performed by: ANESTHESIOLOGY

## 2024-06-18 PROCEDURE — 37000008 HC ANESTHESIA 1ST 15 MINUTES: Performed by: ORTHOPAEDIC SURGERY

## 2024-06-18 PROCEDURE — 71000033 HC RECOVERY, INTIAL HOUR: Performed by: ORTHOPAEDIC SURGERY

## 2024-06-18 PROCEDURE — D9220A PRA ANESTHESIA: Mod: ANES,,, | Performed by: ANESTHESIOLOGY

## 2024-06-18 PROCEDURE — 63600175 PHARM REV CODE 636 W HCPCS

## 2024-06-18 PROCEDURE — 99223 1ST HOSP IP/OBS HIGH 75: CPT | Mod: 57,,, | Performed by: ORTHOPAEDIC SURGERY

## 2024-06-18 PROCEDURE — 84100 ASSAY OF PHOSPHORUS: CPT | Performed by: NURSE PRACTITIONER

## 2024-06-18 PROCEDURE — 27216 TREAT PELVIC RING FRACTURE: CPT | Mod: ,,, | Performed by: ORTHOPAEDIC SURGERY

## 2024-06-18 PROCEDURE — 63600175 PHARM REV CODE 636 W HCPCS: Performed by: ORTHOPAEDIC SURGERY

## 2024-06-18 PROCEDURE — 71000039 HC RECOVERY, EACH ADD'L HOUR: Performed by: ORTHOPAEDIC SURGERY

## 2024-06-18 PROCEDURE — 27216 TREAT PELVIC RING FRACTURE: CPT | Mod: AS,,,

## 2024-06-18 PROCEDURE — 85025 COMPLETE CBC W/AUTO DIFF WBC: CPT | Performed by: NURSE PRACTITIONER

## 2024-06-18 PROCEDURE — 25000003 PHARM REV CODE 250: Performed by: NURSE PRACTITIONER

## 2024-06-18 PROCEDURE — 63600175 PHARM REV CODE 636 W HCPCS: Performed by: NURSE ANESTHETIST, CERTIFIED REGISTERED

## 2024-06-18 PROCEDURE — 11000001 HC ACUTE MED/SURG PRIVATE ROOM

## 2024-06-18 PROCEDURE — 0QS204Z REPOSITION RIGHT PELVIC BONE WITH INTERNAL FIXATION DEVICE, OPEN APPROACH: ICD-10-PCS | Performed by: STUDENT IN AN ORGANIZED HEALTH CARE EDUCATION/TRAINING PROGRAM

## 2024-06-18 PROCEDURE — C1713 ANCHOR/SCREW BN/BN,TIS/BN: HCPCS | Performed by: ORTHOPAEDIC SURGERY

## 2024-06-18 PROCEDURE — 63600175 PHARM REV CODE 636 W HCPCS: Performed by: NURSE PRACTITIONER

## 2024-06-18 PROCEDURE — D9220A PRA ANESTHESIA: Mod: CRNA,,, | Performed by: NURSE ANESTHETIST, CERTIFIED REGISTERED

## 2024-06-18 PROCEDURE — 94799 UNLISTED PULMONARY SVC/PX: CPT

## 2024-06-18 PROCEDURE — 36000711: Performed by: ORTHOPAEDIC SURGERY

## 2024-06-18 PROCEDURE — P9047 ALBUMIN (HUMAN), 25%, 50ML: HCPCS | Mod: JZ,JG | Performed by: NURSE ANESTHETIST, CERTIFIED REGISTERED

## 2024-06-18 PROCEDURE — 97166 OT EVAL MOD COMPLEX 45 MIN: CPT

## 2024-06-18 RX ORDER — NALOXONE HCL 0.4 MG/ML
VIAL (ML) INJECTION
Status: DISCONTINUED | OUTPATIENT
Start: 2024-06-18 | End: 2024-06-18

## 2024-06-18 RX ORDER — ONDANSETRON HYDROCHLORIDE 2 MG/ML
4 INJECTION, SOLUTION INTRAVENOUS ONCE AS NEEDED
Status: DISCONTINUED | OUTPATIENT
Start: 2024-06-18 | End: 2024-06-18 | Stop reason: HOSPADM

## 2024-06-18 RX ORDER — CEFAZOLIN SODIUM 2 G/50ML
2 SOLUTION INTRAVENOUS
Status: DISCONTINUED | OUTPATIENT
Start: 2024-06-18 | End: 2024-06-19 | Stop reason: HOSPADM

## 2024-06-18 RX ORDER — HYDROMORPHONE HYDROCHLORIDE 2 MG/ML
0.4 INJECTION, SOLUTION INTRAMUSCULAR; INTRAVENOUS; SUBCUTANEOUS EVERY 5 MIN PRN
Status: DISCONTINUED | OUTPATIENT
Start: 2024-06-18 | End: 2024-06-18 | Stop reason: HOSPADM

## 2024-06-18 RX ORDER — MIDAZOLAM HYDROCHLORIDE 1 MG/ML
INJECTION INTRAMUSCULAR; INTRAVENOUS
Status: DISCONTINUED | OUTPATIENT
Start: 2024-06-18 | End: 2024-06-18

## 2024-06-18 RX ORDER — FENTANYL CITRATE 50 UG/ML
INJECTION, SOLUTION INTRAMUSCULAR; INTRAVENOUS
Status: DISCONTINUED | OUTPATIENT
Start: 2024-06-18 | End: 2024-06-18

## 2024-06-18 RX ORDER — ALBUMIN HUMAN 250 G/1000ML
SOLUTION INTRAVENOUS
Status: DISCONTINUED | OUTPATIENT
Start: 2024-06-18 | End: 2024-06-18

## 2024-06-18 RX ORDER — CEFAZOLIN SODIUM 2 G/50ML
2 SOLUTION INTRAVENOUS
Status: DISCONTINUED | OUTPATIENT
Start: 2024-06-18 | End: 2024-06-18 | Stop reason: HOSPADM

## 2024-06-18 RX ORDER — ROCURONIUM BROMIDE 10 MG/ML
INJECTION, SOLUTION INTRAVENOUS
Status: DISCONTINUED | OUTPATIENT
Start: 2024-06-18 | End: 2024-06-18

## 2024-06-18 RX ORDER — PHENYLEPHRINE HYDROCHLORIDE 10 MG/ML
INJECTION INTRAVENOUS
Status: DISCONTINUED | OUTPATIENT
Start: 2024-06-18 | End: 2024-06-18

## 2024-06-18 RX ORDER — CALCIUM CHLORIDE INJECTION 100 MG/ML
INJECTION, SOLUTION INTRAVENOUS
Status: DISCONTINUED | OUTPATIENT
Start: 2024-06-18 | End: 2024-06-18

## 2024-06-18 RX ORDER — VANCOMYCIN HYDROCHLORIDE 1 G/20ML
INJECTION, POWDER, LYOPHILIZED, FOR SOLUTION INTRAVENOUS
Status: DISCONTINUED | OUTPATIENT
Start: 2024-06-18 | End: 2024-06-18 | Stop reason: HOSPADM

## 2024-06-18 RX ORDER — MORPHINE SULFATE 4 MG/ML
2 INJECTION, SOLUTION INTRAMUSCULAR; INTRAVENOUS EVERY 4 HOURS PRN
Status: DISCONTINUED | OUTPATIENT
Start: 2024-06-18 | End: 2024-06-19 | Stop reason: HOSPADM

## 2024-06-18 RX ORDER — DEXAMETHASONE SODIUM PHOSPHATE 4 MG/ML
INJECTION, SOLUTION INTRA-ARTICULAR; INTRALESIONAL; INTRAMUSCULAR; INTRAVENOUS; SOFT TISSUE
Status: DISCONTINUED | OUTPATIENT
Start: 2024-06-18 | End: 2024-06-18

## 2024-06-18 RX ORDER — ONDANSETRON HYDROCHLORIDE 2 MG/ML
INJECTION, SOLUTION INTRAVENOUS
Status: DISCONTINUED | OUTPATIENT
Start: 2024-06-18 | End: 2024-06-18

## 2024-06-18 RX ORDER — MEPERIDINE HYDROCHLORIDE 25 MG/ML
6.25 INJECTION INTRAMUSCULAR; INTRAVENOUS; SUBCUTANEOUS ONCE
Status: COMPLETED | OUTPATIENT
Start: 2024-06-18 | End: 2024-06-18

## 2024-06-18 RX ORDER — PROPOFOL 10 MG/ML
VIAL (ML) INTRAVENOUS
Status: DISCONTINUED | OUTPATIENT
Start: 2024-06-18 | End: 2024-06-18

## 2024-06-18 RX ORDER — ONDANSETRON HYDROCHLORIDE 2 MG/ML
4 INJECTION, SOLUTION INTRAVENOUS EVERY 6 HOURS PRN
Status: DISCONTINUED | OUTPATIENT
Start: 2024-06-18 | End: 2024-06-19 | Stop reason: HOSPADM

## 2024-06-18 RX ORDER — LIDOCAINE HYDROCHLORIDE 20 MG/ML
INJECTION, SOLUTION EPIDURAL; INFILTRATION; INTRACAUDAL; PERINEURAL
Status: DISCONTINUED | OUTPATIENT
Start: 2024-06-18 | End: 2024-06-18

## 2024-06-18 RX ADMIN — CEFAZOLIN SODIUM 2 G: 2 SOLUTION INTRAVENOUS at 09:06

## 2024-06-18 RX ADMIN — PHENYLEPHRINE HYDROCHLORIDE 100 MCG: 10 INJECTION INTRAVENOUS at 12:06

## 2024-06-18 RX ADMIN — DEXAMETHASONE SODIUM PHOSPHATE 4 MG: 4 INJECTION, SOLUTION INTRA-ARTICULAR; INTRALESIONAL; INTRAMUSCULAR; INTRAVENOUS; SOFT TISSUE at 11:06

## 2024-06-18 RX ADMIN — HYDROMORPHONE HYDROCHLORIDE 0.4 MG: 2 INJECTION INTRAMUSCULAR; INTRAVENOUS; SUBCUTANEOUS at 12:06

## 2024-06-18 RX ADMIN — PROPOFOL 150 MG: 10 INJECTION, EMULSION INTRAVENOUS at 11:06

## 2024-06-18 RX ADMIN — NALOXONE HYDROCHLORIDE 0.1 MG: 0.4 INJECTION, SOLUTION INTRAMUSCULAR; INTRAVENOUS; SUBCUTANEOUS at 12:06

## 2024-06-18 RX ADMIN — PHENYLEPHRINE HYDROCHLORIDE 100 MCG: 10 INJECTION INTRAVENOUS at 11:06

## 2024-06-18 RX ADMIN — SUGAMMADEX 200 MG: 100 INJECTION, SOLUTION INTRAVENOUS at 11:06

## 2024-06-18 RX ADMIN — ACETAMINOPHEN 650 MG: 325 TABLET, FILM COATED ORAL at 09:06

## 2024-06-18 RX ADMIN — ENOXAPARIN SODIUM 40 MG: 40 INJECTION SUBCUTANEOUS at 09:06

## 2024-06-18 RX ADMIN — ONDANSETRON 4 MG: 2 INJECTION INTRAMUSCULAR; INTRAVENOUS at 07:06

## 2024-06-18 RX ADMIN — CALCIUM CHLORIDE INJECTION 0.5 G: 100 INJECTION, SOLUTION INTRAVENOUS at 11:06

## 2024-06-18 RX ADMIN — GABAPENTIN 300 MG: 300 CAPSULE ORAL at 09:06

## 2024-06-18 RX ADMIN — METHOCARBAMOL 500 MG: 500 TABLET ORAL at 05:06

## 2024-06-18 RX ADMIN — SODIUM CHLORIDE, SODIUM GLUCONATE, SODIUM ACETATE, POTASSIUM CHLORIDE AND MAGNESIUM CHLORIDE: 526; 502; 368; 37; 30 INJECTION, SOLUTION INTRAVENOUS at 11:06

## 2024-06-18 RX ADMIN — ACETAMINOPHEN 650 MG: 325 TABLET, FILM COATED ORAL at 05:06

## 2024-06-18 RX ADMIN — MIDAZOLAM HYDROCHLORIDE 2 MG: 1 INJECTION, SOLUTION INTRAMUSCULAR; INTRAVENOUS at 11:06

## 2024-06-18 RX ADMIN — SODIUM CHLORIDE, POTASSIUM CHLORIDE, SODIUM LACTATE AND CALCIUM CHLORIDE: 600; 310; 30; 20 INJECTION, SOLUTION INTRAVENOUS at 02:06

## 2024-06-18 RX ADMIN — Medication 6 MG: at 09:06

## 2024-06-18 RX ADMIN — METHOCARBAMOL 500 MG: 500 TABLET ORAL at 03:06

## 2024-06-18 RX ADMIN — METHOCARBAMOL 500 MG: 500 TABLET ORAL at 09:06

## 2024-06-18 RX ADMIN — OXYCODONE HYDROCHLORIDE 5 MG: 5 TABLET ORAL at 09:06

## 2024-06-18 RX ADMIN — ACETAMINOPHEN 650 MG: 325 TABLET, FILM COATED ORAL at 03:06

## 2024-06-18 RX ADMIN — ROCURONIUM BROMIDE 50 MG: 10 SOLUTION INTRAVENOUS at 11:06

## 2024-06-18 RX ADMIN — LIDOCAINE HYDROCHLORIDE 4 ML: 20 INJECTION, SOLUTION INTRAVENOUS at 11:06

## 2024-06-18 RX ADMIN — GABAPENTIN 300 MG: 300 CAPSULE ORAL at 03:06

## 2024-06-18 RX ADMIN — ALBUMIN (HUMAN) 100 ML: 12.5 SOLUTION INTRAVENOUS at 11:06

## 2024-06-18 RX ADMIN — CEFAZOLIN SODIUM 2 G: 2 SOLUTION INTRAVENOUS at 11:06

## 2024-06-18 RX ADMIN — OXYCODONE HYDROCHLORIDE 5 MG: 5 TABLET ORAL at 05:06

## 2024-06-18 RX ADMIN — DOCUSATE SODIUM 100 MG: 100 CAPSULE, LIQUID FILLED ORAL at 09:06

## 2024-06-18 RX ADMIN — ONDANSETRON 4 MG: 2 INJECTION INTRAMUSCULAR; INTRAVENOUS at 11:06

## 2024-06-18 RX ADMIN — FENTANYL CITRATE 50 MCG: 50 INJECTION, SOLUTION INTRAMUSCULAR; INTRAVENOUS at 11:06

## 2024-06-18 RX ADMIN — MEPERIDINE HYDROCHLORIDE 6.25 MG: 25 INJECTION INTRAMUSCULAR; INTRAVENOUS; SUBCUTANEOUS at 12:06

## 2024-06-18 NOTE — CONSULTS
[facility]                       [address] [place] [address]    PATIENT NAME:       RIKY VALDES  YOB: 1980  CSN:                [id number]   MRN:                [id number]  ADMIT DATE:         06/17/2024 18:39:00  PHYSICIAN:          Ez Munoz MD                            CONSULTATION    DATE OF CONSULT:  06/18/2024 00:00:00    REASON FOR CONSULTATION:  Multiple pelvic fractures with unstable disruption of   pelvic [place].    CHIEF COMPLAINT:  Low back pain and left groin pain.    HISTORY OF PRESENT ILLNESS:  The patient is a 43-year-old male, who had a fall   off a scaffolding and landed on his side.  He had pain with no deformity.  There   was inability to bear weight.  He was found to have a right complete zone 1   sacral fracture and left superior and inferior rami fractures.  He was   transferred from an outside facility and admitted to the trauma service.  My   partner, Dr. Wheeler was consulted initially and I have agreed to assume care due   to my earlier operative availability.  Upon my evaluation at the bedside, the   patient is resting comfortably.  He has pain with any attempted range of motion   of his left hip in the groin.  He states his pain is a bit controlled with   medication.    REVIEW OF SYSTEMS:  All reported negative aside from HPI  Constitutional: Negative for chills and fever.   HENT: Negative for congestion and hearing loss.    Eyes: Negative for visual disturbance.   Cardiovascular: Negative for chest pain and syncope.   Respiratory: Negative for cough and shortness of breath.    Hematologic/Lymphatic: Does not bruise/bleed easily.   Skin: Negative for color change and rash.   Gastrointestinal: Negative for abdominal pain, nausea and vomiting.   Genitourinary: Negative for dysuria and hematuria.   Neurological: Negative for numbness, sensory change and weakness.    Psychiatric/Behavioral: Negative for altered mental status.       PAST MEDICAL HISTORY:  Reported as none.  He takes no prescription medications.    He says that he did have a brain tumor that was removed as his only surgery   that he has had in the past.    SOCIAL HISTORY:  He vapes.  Denies any other drug use.  No alcohol use.    MEDICATIONS:  He takes no prescription medications.    ALLERGIES:  HAS NO KNOWN DRUG ALLERGIES.     PHYSICAL EXAMINATION:  GENERAL:  He is in no apparent distress.  He is awake, alert, and oriented.   HEAD, EYES, EARS, NOSE, AND THROAT:  His extraocular movements are intact.  He   is normocephalic and atraumatic.  PULMONARY:  He has unlabored respirations with symmetric chest rise.  CARDIOVASCULAR:  He has a normal rate with a regular rhythm.  He has normal   peripheral perfusion.  GI:  His abdomen is soft, nontender, and nondistended and his pelvis is stable   when rocked.  MUSCULOSKELETAL EVALUATION:  Evaluation of bilateral upper extremities, he has   full range of motion of the shoulder, elbow, wrist, and digits.  No tenderness   to palpation.  No crepitus.  Evaluation of his right lower extremity; he   tolerates log roll without significant discomfort.  Gentle passive range of   motion of the hip, knee, ankle, and digits are tolerated well.  Distally, he has   a palpable DP pulse with 5/5 motor strength in dorsiflexion and plantar flexion   of the ankle and digits.  On the left side, he has some tenderness over the   anterior pelvic ring in the groin and pain with attempted active flexion.  He   tolerates gentle circumduction of the left hip without significant discomfort.    He has a 5/5 motor strength with dorsiflexion and plantar flexion in the ankle   and digits distally.  Neither extremities have any open wounds or crepitus with   palpation.    DIAGNOSTIC STUDIES:  X-ray and CT evaluations of the pelvis reveal left-sided   rami fractures and right-sided zone 1 sacral  fractures.    PLAN:  The risks, benefits, and alternatives to treatment were discussed at   length with the patient including, but not limited to pain, bleeding, scarring,   infection, damage to neurovascular structures, malunion, nonunion, need for   future procedures, and complications leading to amputation and even death.  He   will benefit from percutaneous posterior pelvic ring stabilization.  We will   plan to take him to the operating room today for S1, S2, right-sided transsacral   transiliac screws.  He is happy with this plan of care.  All questions and   concerns were addressed.        ______________________________  MD MAMTA Hernández/AQS  DD:  06/18/2024  Time:  07:41AM  DT:  06/18/2024  Time:  08:17AM  Job #:  [id number]      CONSULTATION

## 2024-06-18 NOTE — ANESTHESIA PROCEDURE NOTES
Intubation    Date/Time: 6/18/2024 11:06 AM    Performed by: Pa Willis CRNA  Authorized by: Corby Elizabeth MD    Intubation:     Induction:  Intravenous    Intubated:  Postinduction    Mask Ventilation:  Easy mask    Attempts:  1    Attempted By:  CRNA    Method of Intubation:  Direct    Blade:  Mati 3    Laryngeal View Grade: Grade I - full view of cords      Difficult Airway Encountered?: No      Complications:  None    Airway Device:  Oral endotracheal tube    Airway Device Size:  7.5    Style/Cuff Inflation:  Cuffed (inflated to minimal occlusive pressure)    Tube secured:  21    Secured at:  The lips    Placement Verified By:  Capnometry    Complicating Factors:  None    Findings Post-Intubation:  BS equal bilateral and atraumatic/condition of teeth unchanged

## 2024-06-18 NOTE — BRIEF OP NOTE
[facility] - Periop Services  Brief Operative Note    SUMMARY     Surgery Date: 6/18/2024     Surgeons and Role:     * Ez Munoz MD - Primary    Assisting Surgeon: None    Pre-op Diagnosis:  Closed pelvic ring fracture, initial encounter [S32.810A]    Post-op Diagnosis:  Post-Op Diagnosis Codes:     * Closed pelvic ring fracture, initial encounter [S32.810A]    Procedure(s) (LRB):  R perc posterior pelvic ring fixation S1  R perc posterior pelvic ring fixation S2    Anesthesia: General    Implants:  Implant Name Type Inv. Item Serial No.  Lot No. LRB No. Used Action   Karla 8 x 140mm FT Screw      Right 1 Implanted   [employer]  8 x 145mm FT Screw      Right 1 Implanted       Operative Findings: see op report    Estimated Blood Loss: *20mL    Estimated Blood Loss has been documented.         Specimens:   Specimen (24h ago, onward)      None            [id number]  A/P: Tolerated procedure well. Admit to floor. 50% WB to RLE. WBAT to LLE with walker. Full ROM BLE. Lovenox for DVT ppx. Ancef for 24hrs. CM eval for DME. D/C in next 1-2 days. ECASA 81mg PO BID for 4 weeks upon d/c. Will Begin WBAT in 6 weeks.      Ez Munoz MD  Orthopedic Trauma  [facility]

## 2024-06-18 NOTE — OP NOTE
[facility]                       [address]                      [place] [address]    PATIENT NAME:      RIKY VALDES  YOB: 1980  CSN:               [id number]  MRN:               [id number]  ADMIT DATE:        06/17/2024 18:39:00  PHYSICIAN:         Ez Munoz MD                          OPERATIVE REPORT      DATE OF SURGERY:    06/18/2024 00:00:00    SURGEON:  Ez Munoz MD    PREOPERATIVE DIAGNOSIS:  Multiple pelvic fractures with unstable disruption of   pelvic [place].    POSTOPERATIVE DIAGNOSIS:  Multiple pelvic fractures with unstable disruption of   pelvic [place].    PROCEDURE:    1. Right percutaneous posterior pelvic ring stabilization S1.  2. Right percutaneous posterior stabilization S2.    ANESTHESIA:  General.    ESTIMATED BLOOD LOSS:  20 mL.    ASSISTANT:  YA Bah necessary for a skilled set of hands to assist   with reduction of the fracture as well as application of hardware and deep   closure.    IMPLANTS:  [place] 8.0 mm fully threaded cannulated screws for each the S1   and S2 segments.    COMPLICATIONS:  None.    COUNTS:  All counts correct x2 at the end of the case.    INDICATIONS FOR PROCEDURE:  The patient is a 43-year-old male, who fell from a   scaffolding onto his right side.  He sustained a lateral compression pelvic ring   injury with left-sided superior and inferior rami fractures, which were   nondisplaced.  He had a complete right zone 2 sacral fracture extending from S1   down into the S2 segment.  The risks, benefits, and alternatives to treatment   were discussed at length with the patient.  He is brought to the operating room   for operative stabilization of his unstable fracture.    PROCEDURE IN DETAIL:  After informed consent was obtained, the patient was met   in the preoperative holding area and his site was marked.  He was taken to the   operating room.  He was  placed supine on the operating table.  General   anesthesia was induced.  All bony prominences were well padded and preoperative   antibiotics were given.  His right hip was prepped and draped in a standard   sterile fashion.  We were able to obtain safe trajectories on inlet, outlet, and   lateral imaging and incision was made over the lateral aspect of the hip.  The   wire was introduced into the lateral border of the pelvis and placed 1 across   S1.  Initially it stopped short of the left-sided SI joint to ensure that it was   in the safe trajectory.  We scrutinized our images on fluoroscopy, it was   confirmed to be appropriate.  The length was measured and drill was then   applied.  We then placed a transsacral-transiliac screw across the body of S2.    Again on inlet, outlet, and lateral images, safe trajectories were obtained and   had a clear straight shot across.  The length was measured and another 8.0   fully-threaded screw was applied in a transsacral-transiliac fashion.  This   concluded our stabilization.  Stress examination in the ring was performed.  He   was noted to have stable superior and inferior rami fractures.  Both of the   screws were confirmed to be in appropriate position on inlet, outlet, and   lateral views.  Wound was irrigated.  Layered closure was performed with a 2-0   Monocryl and staples.  Xeroform and island dressing were applied.  The patient   was awakened, extubated, and taken to recovery in stable condition.    POSTOPERATIVE PLAN:  He will be admitted to the floor.  He can be 50%   weightbearing to the right lower extremity, full range of motion in both lower   extremities.  He can weightbear as tolerated on the left side with a walker.  We   will have him mobilize with PT.  Lovenox for deep vein thrombosis prophylaxis   while in-house.  He can be discharged on enteric-coated aspirin 81 mg p.o.   b.i.d. upon discharge.  Case Management and evaluation for DME.  Plan for    discharge the next 1-2 days.        ______________________________  MD MAMTA Hernández/SONA  DD:  06/18/2024  Time:  12:00PM  DT:  06/18/2024  Time:  12:55PM  Job #:  [id number]      OPERATIVE REPORT

## 2024-06-18 NOTE — NURSING
Nurses Note -- 4 Eyes      6/18/2024   4:05 PM      Skin assessed during: Admit      [] No Altered Skin Integrity Present    []Prevention Measures Documented      [x] Yes- Altered Skin Integrity Present or Discovered   [] LDA Added if Not in Epic (Describe Wound)   [x] New Altered Skin Integrity was Present on Admit and Documented in LDA   [] Wound Image Taken    Wound Care Consulted? No    Attending Nurse:  Sintia Doherty RN/Staff Member:   Geetha

## 2024-06-18 NOTE — TRANSFER OF CARE
Anesthesia Transfer of Care Note    Patient: Ángel Borges Jr.    Procedure(s) Performed: Procedure(s) (LRB):  ORIF, SACROILIAC JOINT (Right)    Patient location: PACU    Anesthesia Type: general    Transport from OR: Transported from OR on room air with adequate spontaneous ventilation    Post pain: adequate analgesia    Post assessment: no apparent anesthetic complications and tolerated procedure well    Post vital signs: stable    Level of consciousness: sedated and responds to stimulation    Nausea/Vomiting: no nausea/vomiting    Complications: none    Transfer of care protocol was followed      Last vitals: Visit Vitals  BP (!) 126/91 (BP Location: Left arm, Patient Position: Lying)   Pulse 83   Temp 36.5 °C (97.7 °F) (Skin)   Resp 18   Ht 6' (1.829 m)   Wt 74.8 kg (165 lb)   SpO2 100%   BMI 22.38 kg/m²

## 2024-06-18 NOTE — ANESTHESIA POSTPROCEDURE EVALUATION
Anesthesia Post Evaluation    Patient: Ángel Borges Jr.    Procedure(s) Performed: Procedure(s) (LRB):  ORIF, SACROILIAC JOINT (Right)    Final Anesthesia Type: general      Patient location during evaluation: PACU  Patient participation: Yes- Able to Participate  Level of consciousness: oriented and awake  Post-procedure vital signs: reviewed and stable  Pain management: adequate  Airway patency: patent    PONV status at discharge: No PONV  Anesthetic complications: no      Cardiovascular status: stable and hemodynamically stable  Respiratory status: spontaneous ventilation and unassisted  Hydration status: euvolemic  Follow-up not needed.  Comments: [facility]              Vitals Value Taken Time   /57 06/18/24 1400   Temp 36.4 °C (97.5 °F) 06/18/24 1218   Pulse 94 06/18/24 1400   Resp 18 06/18/24 1335   SpO2 92 % 06/18/24 1400   Vitals shown include unfiled device data.      Event Time   Out of Recovery 06/18/2024 13:35:00         Pain/Mary Score: Presence of Pain: complains of pain/discomfort (6/17/2024  5:40 PM)  Pain Rating Prior to Med Admin: 5 (6/18/2024 12:49 PM)  Pain Rating Post Med Admin: 7 (6/18/2024  7:34 AM)  Mary Score: 10 (6/18/2024  1:35 PM)

## 2024-06-18 NOTE — PLAN OF CARE
Problem: Occupational Therapy  Goal: Occupational Therapy Goal  Description: LTG: Pt will perform basic ADLs and ADL transfers with Modified independence using LRAD by discharge.    STG: to be met by 7/16/24:    Pt will complete grooming standing at sink with LRAD with SBA.  Pt will complete UB dressing with SBA.  Pt will complete LB dressing with SBA using LRAD.  Pt will complete toileting with SBA using LRAD.  Pt will complete functional mobility to/from toilet and toilet transfer with SBA using LRAD.   Outcome: Progressing

## 2024-06-18 NOTE — PT/OT/SLP EVAL
Occupational Therapy  Evaluation    Name: Ángel Borges Jr.  MRN: [id number]  Admitting Diagnosis:  Multiple pelvic fractures with unstable disruption of   pelvic [place] s/p R percutaneous posterior pelvic ring stabilization S1, R percutaneous posterior stabilization S2   Recent Surgery: Procedure(s) (LRB):  ORIF, SACROILIAC JOINT (Right) Day of Surgery    Recommendations:     Discharge therapy intensity: Low Intensity Therapy   Discharge Equipment Recommendations:  walker, rolling  Barriers to discharge:  Other (Comment) (ongoing medical needs)    Assessment:     Ángel Borges Jr. is a 43 y.o. male with a medical diagnosis of  Multiple pelvic fractures with unstable disruption of   pelvic [place] s/p R percutaneous posterior pelvic ring stabilization S1, R percutaneous posterior stabilization S2 . Prior to admit, pt was IND c ADLs and mobility. He presents with the following performance deficits affecting function: impaired endurance, orthopedic precautions, impaired self care skills, impaired functional mobility, impaired balance, pain. Pt educated on post-op precautions and demonstrated good understanding and adherence during session. Pt able to don shorts c SBA and complete toilet t/f c CGA. Reported his fiance will be able to assist at d/c.  Recommend low intensity therapy.     Rehab Prognosis: Good; patient would benefit from acute skilled OT services to address these deficits and reach maximum level of function.       Plan:     Patient to be seen 5 x/week to address the above listed problems via self-care/home management, therapeutic exercises, therapeutic activities  Plan of Care Expires: 07/24/24  Plan of Care Reviewed with: patient    Subjective     Chief Complaint: Pelvic pain   Patient/Family Comments/goals: To go home     Occupational Profile:  Living Environment: Pt lives c his fiance and 2 children in a SLH c a tub/shower.   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      Patient was found HOB elevated with peripheral IV upon OT entry to room.    General Precautions: Standard, fall  Orthopedic Precautions:  (50% WB RLE, WBAT LLE c RW, full ROM BLE)  Braces: N/A    Vital Signs: Blood Pressure: 105/66, 121/74    Bed Mobility:    Patient completed Supine to Sit with stand by assistance  Patient completed Sit to Supine with stand by assistance    Functional Mobility/Transfers:  Patient completed Sit <> Stand Transfer with contact guard assistance  with  rolling walker   Patient completed Toilet Transfer Step Transfer technique with contact guard assistance with  rolling walker  Functional Mobility: Pt ambulated to bathroom using RW c CGA for safety; Required cues to decrease gait speed.    Activities of Daily Living:  Lower Body Dressing: stand by assistance to don shorts from bed level    Functional Cognition:  Intact    Visual Perceptual Skills:  Intact    Upper Extremity Function:  Right Upper Extremity:   WFL    Left Upper Extremity:  WFL      Therapeutic Positioning  Risk for acquired pressure injuries is decreased due to ability to get to BSC/toilet with assist.    OT interventions performed during the course of today's session:   Education was provided on benefits of and recommendations for therapeutic positioning    Skin assessment:Unable to visualize sacrum 2/2 shorts, no pressure related injuries on heels     OT recommendations for therapeutic positioning throughout hospitalization:   Follow [facility] Pressure Injury Prevention Protocol      Patient Education:  Patient provided with verbal education  education regarding OT role/goals/POC, post op precautions, and fall prevention.  Understanding was verbalized.     Patient left HOB elevated with all lines intact and call button in reach.    GOALS:   Multidisciplinary Problems       Occupational Therapy Goals          Problem: Occupational Therapy    Goal Priority Disciplines Outcome Interventions   Occupational Therapy Goal     OT, PT/OT Progressing    Description: LTG: Pt will perform basic ADLs and ADL transfers with Modified independence using LRAD by discharge.    STG: to be met by 7/16/24:    Pt will complete grooming standing at sink with LRAD with SBA.  Pt will complete UB dressing with SBA.  Pt will complete LB dressing with SBA using LRAD.  Pt will complete toileting with SBA using LRAD.  Pt will complete functional mobility to/from toilet and toilet transfer with SBA using LRAD.                        History:     Past Medical History:   Diagnosis Date    Seizure disorder     last seizure, 2017         Past Surgical History:   Procedure Laterality Date    BRAIN TUMOR EXCISION      2017       Time Tracking:     OT Date of Treatment: 06/18/24  OT Start Time: 1412  OT Stop Time: 1429  OT Total Time (min): 17 min    Billable Minutes:Evaluation Moderate complexity     6/18/2024

## 2024-06-18 NOTE — PROGRESS NOTES
Trauma Surgery   Progress Note  Admit Date: 6/17/2024  HD#1  POD#Day of Surgery    Subjective:   Interval history:  Patient reports doing well. Going to OR with Ortho today 6/18    Home Meds: No current outpatient medications   Scheduled Meds:   acetaminophen  650 mg Oral Q4H    docusate sodium  100 mg Oral BID    enoxparin  40 mg Subcutaneous Q12H    gabapentin  300 mg Oral TID    methocarbamoL  500 mg Oral Q8H    polyethylene glycol  17 g Oral BID     Continuous Infusions:   lactated ringers   Intravenous Continuous 125 mL/hr at 06/18/24 0341 Rate Change at 06/18/24 0341     PRN Meds:  Current Facility-Administered Medications:     ceFAZolin 2 g/50mL Dextrose IVPB, 2 g, Intravenous, On Call Procedure    magnesium hydroxide 400 mg/5 ml, 30 mL, Oral, Daily PRN    melatonin, 6 mg, Oral, Nightly PRN    oxyCODONE, 10 mg, Oral, Q4H PRN    oxyCODONE, 5 mg, Oral, Q4H PRN    vancomycin, , , PRN     Objective:     VITAL SIGNS: 24 HR MIN & MAX LAST   Temp  Min: 98.1 °F (36.7 °C)  Max: 98.7 °F (37.1 °C)  98.7 °F (37.1 °C)   BP  Min: 85/46  Max: 124/93  110/68    Pulse  Min: 50  Max: 100  68    Resp  Min: 12  Max: 21  17    SpO2  Min: 93 %  Max: 100 %  98 %      HT: 6' (182.9 cm)  WT: 74.8 kg (165 lb)  BMI: 22.4     Intake/output:  Intake/Output - Last 3 Shifts         06/16 0700 06/17 0659 06/17 0700 06/18 0659 06/18 0700 06/19 0659    I.V. (mL/kg)   100 (1.3)    IV Piggyback   50    Total Intake(mL/kg)   150 (2)    Urine (mL/kg/hr)   450 (1.2)    Total Output   450    Net   -300                   Intake/Output Summary (Last 24 hours) at 6/18/2024 1153  Last data filed at 6/18/2024 1126  Gross per 24 hour   Intake 150 ml   Output 450 ml   Net -300 ml         Lines/drains/airway:       Peripheral IV - Single Lumen 18 G Right Antecubital (Active)   Site Assessment Clean;Dry;Intact 06/18/24 0810   Extremity Assessment Distal to IV No abnormal discoloration;No redness;No swelling 06/18/24 0810   Dressing Status  "Clean;Dry;Intact 06/18/24 0810   Dressing Intervention Integrity maintained 06/18/24 0810   Number of days:        Physical Exam:  General:  Well developed, no acute distress  HEENT:  Normocephalic, atraumatic   CV:  RR, 2+ DPs bilaterally  Resp/chest: NWOB rt chest wall ttp   GI:  Abdomen soft, non-tender, non-distended  MSK:  moving all extremities spontaneously   -- pain in pelvis with ROM of BLE   Neuro: CNII-XII grossly intact, alert and oriented to person, place, and time. Strength and motor function grossly intact to all extremities, sensation intact to all extremities.  Skin/Wounds:  warm dry, well perfused    Labs:  Renal:  Recent Labs     06/17/24  1535 06/18/24  0354   BUN 14.0 11.5   CREATININE 1.38* 0.93     No results for input(s): "LACTIC" in the last 72 hours.  FEN/GI:  Recent Labs     06/17/24  1535 06/18/24  0354    137   K 4.0 3.6    106   CO2 26 26   CALCIUM 9.9 8.2*   MG  --  1.80   PHOS  --  3.7   ALBUMIN 4.5 3.0*   BILITOT 0.8 0.6   AST 33 20   ALKPHOS 59 39*   ALT 27 19     Heme:  Recent Labs     06/17/24 1535 06/18/24  0256   HGB 15.9 13.0*   HCT 45.1 38.7*    133   INR 1.0  --      ID:  Recent Labs     06/17/24 1535 06/18/24  0256   WBC 7.11 8.63     CBG:  Recent Labs     06/17/24 1535 06/18/24  0354   GLUCOSE 99 95      No results for input(s): "POCTGLUCOSE" in the last 72 hours.   Cardiovascular:  No results for input(s): "TROPONINI", "CKTOTAL", "CKMB", "BNP" in the last 168 hours.  I have reviewed all pertinent lab results within the past 24 hours.    Imaging:  No orders to display      I have reviewed all pertinent imaging results/findings within the past 24 hours.    Micro/Path/Other:  Microbiology Results (last 7 days)       ** No results found for the last 168 hours. **           Pathology Results  (Last 7 days)      None             Problems list:  Active Problem List with Overview Notes    Diagnosis Date Noted    Fall 06/18/2024    Closed pelvic ring fracture " 06/18/2024        Assessment & Plan:   R sacral ala  and L pubic bone fx  - Ortho consulted. Plan to take patient to OR today 6/18.  - NPO  - Daily labs  - MM pain control  - IS  - PT/OT when cleared  - Prophylactic Lovenox 40mg BID       Connie Clifford MD  [facility] Family Medicine Resident, [facility]

## 2024-06-18 NOTE — PROGRESS NOTES
Pt has a Right post pelvic ring fracture with possible extension into S2. We will make him NPO for possible Sx tomorrow. MEILY, NPO SANTINO Wheeler

## 2024-06-18 NOTE — ED PROVIDER NOTES
Encounter Date: 6/17/2024       History   No chief complaint on file.    The history is provided by the patient.   Fall  The accident occurred today. The fall occurred while standing. He fell from a height of 11 to 15 ft. He landed on [place]. The point of impact was the left hip. The pain is present in the lower back and left hip. He was Not ambulatory at the scene. There was No entrapment after the fall. There was No drug use involved in the accident. There was No alcohol use involved in the accident. Associated symptoms include back pain. Pertinent negatives include no paresthesias, no paralysis, no fever, no numbness, no bowel incontinence, no nausea and no loss of consciousness. The symptoms are aggravated by use of the injured limb. Treatment on scene includes Medications. The treatment provided mild relief.   Arrives as transfer from [facility] with diagnosis of pelvic fractures.  Pelvis bound with sheet on arrival.    Review of patient's allergies indicates:   Allergen Reactions    Camphor-methyl sal-menthol-pep Hives     Past Medical History:   Diagnosis Date    Seizure disorder     last seizure, 2017     Past Surgical History:   Procedure Laterality Date    BRAIN TUMOR EXCISION      2017     No family history on file.  Social History     Tobacco Use    Smoking status: Some Days     Types: Vaping with nicotine    Smokeless tobacco: Never    Tobacco comments:     vaps   Substance Use Topics    Alcohol use: Yes     Comment: occassional    Drug use: Never     Review of Systems   Constitutional:  Negative for fever.   HENT:  Negative for sore throat.    Respiratory:  Negative for shortness of breath.    Cardiovascular:  Negative for chest pain.   Gastrointestinal:  Negative for bowel incontinence and nausea.   Genitourinary:  Negative for dysuria.   Musculoskeletal:  Positive for back pain.   Skin:  Negative for rash.   Neurological:  Negative for loss of consciousness, weakness, numbness and paresthesias.    Hematological:  Does not bruise/bleed easily.       Physical Exam     Initial Vitals [06/17/24 1833]   BP Pulse Resp Temp SpO2   (!) 98/56 100 18 98.7 °F (37.1 °C) 97 %      MAP       --         Physical Exam    Nursing note and vitals reviewed.  Constitutional: He appears well-developed and well-nourished.   HENT:   Head: Normocephalic and atraumatic.       Right Ear: External ear normal.   Left Ear: External ear normal.   Nose: Nose normal.   Eyes: Conjunctivae and EOM are normal. Pupils are equal, round, and reactive to light.   Neck: Neck supple.   Normal range of motion.  Cardiovascular:  Normal rate, regular rhythm, normal heart sounds and intact distal pulses.           Pulmonary/Chest: Breath sounds normal.   Abdominal: Abdomen is soft. Bowel sounds are normal.   Musculoskeletal:         General: Normal range of motion.      Cervical back: Normal range of motion and neck supple.        Legs:      Neurological: He is alert and oriented to person, place, and time. He has normal strength. GCS score is 15. GCS eye subscore is 4. GCS verbal subscore is 5. GCS motor subscore is 6.   Skin: Skin is warm and dry. Capillary refill takes less than 2 seconds.   Psychiatric: He has a normal mood and affect. His behavior is normal. Judgment and thought content normal.         ED Course   Procedures  Labs Reviewed - No data to display       Imaging Results    None          Medications   fentaNYL injection 100 mcg (has no administration in time range)   lactated ringers bolus 1,000 mL (has no administration in time range)     Medical Decision Making  Risk  Prescription drug management.  Parenteral controlled substances.  Decision regarding hospitalization.    Differential includes:  stable pelvic ring fracture, unstable pelvic ring fracture, pelvic hematoma.  Case discussed with Dr. Wheeler (trauma ortho) who will see patient in the ED and YA Mitchell (trauma surgery) who will evaluate in the ED as well.                                   Clinical Impression:  Final diagnoses:  [S32.592A] Pubic ramus fracture, left, closed, initial encounter  [S32.10XA, S32.2XXA] Closed fracture of sacrum and coccyx, initial encounter          ED Disposition Condition    Admit Stable                Angelo Bell MD  06/17/24 1920

## 2024-06-18 NOTE — ANESTHESIA PREPROCEDURE EVALUATION
06/17/2024  Ángel Borges Jr. is a 43 y.o., male, s/p 10-15 Ft from [facility] with R sacral ala Fx and L pubic bone Fx presents for the following:    Procedure: ORIF, SACROILIAC JOINT (Right) - supine [place] c arm zaheer Right SI screws   Anesthesia type: General   Diagnosis: Closed pelvic ring fracture, initial encounter [S32.810A]   Pre-op diagnosis: Closed pelvic ring fracture, initial encounter [S32.810A]   Location: [facility] OR [address] [place] [address] OR   Surgeons: Ez Munoz MD     LAB (6/17/24):            Pre-op Assessment    I have reviewed the Patient Summary Reports.     I have reviewed the Nursing Notes. I have reviewed the NPO Status.   I have reviewed the Medications.     Review of Systems  Anesthesia Hx:  No problems with previous Anesthesia             Denies Family Hx of Anesthesia complications.    Denies Personal Hx of Anesthesia complications.                    Social:  Vaping       Cardiovascular:  Cardiovascular Normal                                            Pulmonary:  Pulmonary Normal                       Neurological:       Seizures    Last Seizure: 2017  H/O Craniotomy for a Brain Tumor (2017)                            Endocrine:  Endocrine Normal                Physical Exam  General: Alert and Oriented    Airway:  Mallampati: II   Mouth Opening: Normal  TM Distance: Normal  Tongue: Normal  Neck ROM: Normal ROM    Dental:  Intact  Age Related Wear, numerous chipped teeth  Chest/Lungs:  Normal Respiratory Rate    Heart:  Rate: Normal  Rhythm: Regular Rhythm        Anesthesia Plan  Type of Anesthesia, risks & benefits discussed:    Anesthesia Type: Gen ETT  Intra-op Monitoring Plan: Standard ASA Monitors  Post Op Pain Control Plan: IV/PO Opioids PRN and multimodal analgesia  Induction:  IV  Airway Plan: Direct, Post-Induction  Informed Consent: Informed consent signed with the  Patient and all parties understand the risks and agree with anesthesia plan.  All questions answered. Patient consented to blood products? Yes  ASA Score: 2  Day of Surgery Review of History & Physical: H&P Update referred to the surgeon/provider.    Ready For Surgery From Anesthesia Perspective.     .

## 2024-06-18 NOTE — H&P
Trauma Surgery   History and Physical Note    Patient Name: Ángel Borges Jr.  YOB: 1980  Date: 06/17/2024 7:33 PM  Date of Admission: 6/17/2024  HD#0  POD#* No surgery date entered *    PRESENTING HISTORY   Chief Complaint/Reason for Admission: Fall    History of Present Illness:  43 year old male presents from OSH s/p fall from scaffolding landing on side. C/o bilateral hip pain with no distal neurological changes. CT from outside facility with R sacral ala and L pubic bone fractures. No acute traumatic injuries of CT head or neck. Labs significant for Scr of 1.38     Review of Systems:  12 point ROS negative except as stated in HPI    PAST HISTORY:   Past medical history:  Past Medical History:   Diagnosis Date    Seizure disorder     last seizure, 2017     (Denies PMH)   Past surgical history:  Past Surgical History:   Procedure Laterality Date    BRAIN TUMOR EXCISION      2017       Family history:  No family history on file.    Social history:  Social History     Socioeconomic History    Marital status: Single   Tobacco Use    Smoking status: Some Days     Types: Vaping with nicotine    Smokeless tobacco: Never    Tobacco comments:     vaps   Substance and Sexual Activity    Alcohol use: Yes     Comment: occassional    Drug use: Never     Social History     Tobacco Use   Smoking Status Some Days    Types: Vaping with nicotine   Smokeless Tobacco Never   Tobacco Comments    vaps      Social History     Substance and Sexual Activity   Alcohol Use Yes    Comment: occassional        MEDICATIONS & ALLERGIES:   Allergies:   Review of patient's allergies indicates:   Allergen Reactions    Camphor-methyl sal-menthol-pep Hives     Home Meds: No current outpatient medications   Current Facility-Administered Medications on File Prior to Encounter   Medication Dose Route Frequency Provider Last Rate Last Admin    [COMPLETED] 0.9%  NaCl infusion  1,000 mL Intravenous ED 1 Time Maikol Zavala MD    Stopped at 06/17/24 1654    [COMPLETED] 0.9%  NaCl infusion  1,000 mL Intravenous ED 1 Time Maikol Zavala  mL/hr at 06/17/24 1656 1,000 mL at 06/17/24 1656    [COMPLETED] iopamidoL (ISOVUE-370) injection 100 mL  100 mL Intravenous ONCE PRN Maikol Zavala MD   100 mL at 06/17/24 1554    [COMPLETED] morphine injection 4 mg  4 mg Intravenous ED 1 Time Maikol Zavala MD   4 mg at 06/17/24 1535    [COMPLETED] morphine injection 4 mg  4 mg Intravenous ED 1 Time Maikol Zavala MD   4 mg at 06/17/24 1648     Current Outpatient Medications on File Prior to Encounter   Medication Sig Dispense Refill    [DISCONTINUED] promethazine (PHENERGAN) 25 MG tablet Take 1 tablet (25 mg total) by mouth every 4 (four) hours. 12 tablet 0    [DISCONTINUED] traMADoL (ULTRAM) 50 mg tablet Take 1 tablet (50 mg total) by mouth every 6 (six) hours as needed for Pain. 12 tablet 0    [DISCONTINUED] traMADoL (ULTRAM) 50 mg tablet Take 1 tablet (50 mg total) by mouth every 6 (six) hours as needed. 12 tablet 0    [DISCONTINUED] traMADoL (ULTRAM) 50 mg tablet Take 1 tablet (50 mg total) by mouth every 6 (six) hours as needed. 12 tablet 0      Current Facility-Administered Medications on File Prior to Encounter   Medication    [COMPLETED] 0.9%  NaCl infusion    [COMPLETED] 0.9%  NaCl infusion    [COMPLETED] iopamidoL (ISOVUE-370) injection 100 mL    [COMPLETED] morphine injection 4 mg    [COMPLETED] morphine injection 4 mg     Current Outpatient Medications on File Prior to Encounter   Medication Sig    [DISCONTINUED] promethazine (PHENERGAN) 25 MG tablet Take 1 tablet (25 mg total) by mouth every 4 (four) hours.    [DISCONTINUED] traMADoL (ULTRAM) 50 mg tablet Take 1 tablet (50 mg total) by mouth every 6 (six) hours as needed for Pain.    [DISCONTINUED] traMADoL (ULTRAM) 50 mg tablet Take 1 tablet (50 mg total) by mouth every 6 (six) hours as needed.    [DISCONTINUED] traMADoL (ULTRAM) 50 mg tablet Take 1 tablet (50 mg total) by mouth every 6  "(six) hours as needed.     Scheduled Meds:   lactated ringers  1,000 mL Intravenous ED 1 Time     Continuous Infusions:  PRN Meds:    OBJECTIVE:   Vital Signs:  VITAL SIGNS: 24 HR MIN & MAX LAST   Temp  Min: 98.1 °F (36.7 °C)  Max: 98.7 °F (37.1 °C)  98.7 °F (37.1 °C)   BP  Min: 96/71  Max: 124/93  103/62    Pulse  Min: 66  Max: 100  88    Resp  Min: 18  Max: 20  19    SpO2  Min: 96 %  Max: 100 %  98 %      HT: 6' (182.9 cm)  WT: 74.8 kg (165 lb)  BMI: 22.4   Intake/output: No intake/output data recorded.     Lines/drains/airway:       Peripheral IV - Single Lumen 18 G Right Antecubital (Active)   Number of days:        Physical Exam:  General:  Well developed, well nourished, no acute distress  HEENT:  Normocephalic, atraumatic poor dentition   CV:  RR, 2+ DPs bilaterally  Resp/chest: NWOB rt chest wall ttp   GI:  Abdomen soft, non-tender, non-distended  :  Deferred  MSK:  No muscle atrophy, cyanosis, peripheral edema, moving all extremities spontaneously   -- pain in pelvis with ROM of BLE   Neuro: GCS 15 . CNII-XII grossly intact, alert and oriented to person, place, and time. Strength and motor function grossly intact to all extremities, sensation intact to all extremities.  Skin/Wounds:  warm dry     Labs:  Troponin:  No results for input(s): "TROPONINI" in the last 72 hours.  CBC:  Recent Labs     06/17/24  1535   WBC 7.11   RBC 5.14   HGB 15.9   HCT 45.1      MCV 87.7   MCH 30.9   MCHC 35.3     CMP:  Recent Labs     06/17/24  1535   CALCIUM 9.9   ALBUMIN 4.5      K 4.0   CO2 26      BUN 14.0   CREATININE 1.38*   ALKPHOS 59   ALT 27   AST 33   BILITOT 0.8     Lactic Acid:  No results for input(s): "LACTATE" in the last 72 hours.  ETOH:  No results for input(s): "ETHANOL" in the last 72 hours.   Urine Drug Screen:  Recent Labs     06/17/24  1552   FENTANYL Negative   MDMA Negative      ABG  No results for input(s): "PH", "PO2", "PCO2", "HCO3", "BE" in the last 168 hours.      Diagnostic " Results:  No orders to display       ASSESSMENT & PLAN:      43 year old male s/p 10-15 Ft from scaffolding with R sacral ala Fx and L pubic bone Fx     Admit to Trauma Floor   Ortho consulted, appreciate recs   Regular diet NPO at MN   mIVF @ 100  Daily labs   MM pain control  IS  Physical Therapy when able  Occupational Therapy when able  Prophylactic Lovenox 40mg BID   home med rec  tertiary exam     Paula Lua [facility]   Trauma/Acute Care Surgery  [facility]  C: [phone]

## 2024-06-18 NOTE — PLAN OF CARE
06/18/24 1502   Discharge Assessment   Assessment Type Discharge Planning Assessment   Confirmed/corrected address, phone number and insurance Yes   Confirmed Demographics Correct on Facesheet   Source of Information patient   Communicated SALLY with patient/caregiver Date not available/Unable to determine   Reason For Admission fall   People in Home child(jag), dependent;significant other   Do you expect to return to your current living situation? Other (see comments)  (tbd)   Do you have help at home or someone to help you manage your care at home? No   Prior to hospitilization cognitive status: Alert/Oriented   Current cognitive status: Alert/Oriented   Home Accessibility stairs to enter home   Number of Stairs, Main Entrance three   Stair Railings, Main Entrance none   Equipment Currently Used at Home none   Readmission within 30 days? No   Patient currently being followed by outpatient case management? No   Do you currently have service(s) that help you manage your care at home? No   Do you take prescription medications? Yes   Do you have prescription coverage? Yes   Coverage [facility]   Do you have any problems affording any of your prescribed medications? No   Is the patient taking medications as prescribed? yes   Who is going to help you get home at discharge? family   How do you get to doctors appointments? family or friend will provide;car, drives self   Are you on dialysis? No   Do you take coumadin? No   Discharge Plan A Other  (tbd)   Discharge Plan B Other  (tbd)   DME Needed Upon Discharge  other (see comments)  (tbd)   Discharge Plan discussed with: Patient   Transition of Care Barriers None   OTHER   Name(s) of People in Home 2 children and SO     Completed assessment with pt at bedside. Introduced self and explained role as SW. Pt verb understanding to all questions asked. Pt has no PCP; note placed in chart for discharging nurse/ to assist at d/c. Pharmacy is [address]. D/c dispo  pending post-op therapy recs.     OMAR MarieW

## 2024-06-19 VITALS
BODY MASS INDEX: 22.35 KG/M2 | DIASTOLIC BLOOD PRESSURE: 74 MMHG | OXYGEN SATURATION: 99 % | TEMPERATURE: 98 F | WEIGHT: 165 LBS | SYSTOLIC BLOOD PRESSURE: 118 MMHG | HEART RATE: 88 BPM | HEIGHT: 72 IN | RESPIRATION RATE: 18 BRPM

## 2024-06-19 LAB
ALBUMIN SERPL-MCNC: 3.4 G/DL (ref 3.5–5)
ALBUMIN/GLOB SERPL: 1.2 RATIO (ref 1.1–2)
ALP SERPL-CCNC: 47 UNIT/L (ref 40–150)
ALT SERPL-CCNC: 18 UNIT/L (ref 0–55)
ANION GAP SERPL CALC-SCNC: 6 MEQ/L
AST SERPL-CCNC: 22 UNIT/L (ref 5–34)
BASOPHILS # BLD AUTO: 0 X10(3)/MCL
BASOPHILS NFR BLD AUTO: 0 %
BILIRUB SERPL-MCNC: 0.3 MG/DL
BUN SERPL-MCNC: 11.8 MG/DL (ref 8.9–20.6)
CALCIUM SERPL-MCNC: 8.8 MG/DL (ref 8.4–10.2)
CHLORIDE SERPL-SCNC: 105 MMOL/L (ref 98–107)
CO2 SERPL-SCNC: 29 MMOL/L (ref 22–29)
CREAT SERPL-MCNC: 1.09 MG/DL (ref 0.73–1.18)
CREAT/UREA NIT SERPL: 11
EOSINOPHIL # BLD AUTO: 0.05 X10(3)/MCL (ref 0–0.9)
EOSINOPHIL NFR BLD AUTO: 0.5 %
ERYTHROCYTE [DISTWIDTH] IN BLOOD BY AUTOMATED COUNT: 12.5 % (ref 11.5–17)
GFR SERPLBLD CREATININE-BSD FMLA CKD-EPI: >60 ML/MIN/1.73/M2
GLOBULIN SER-MCNC: 2.8 GM/DL (ref 2.4–3.5)
GLUCOSE SERPL-MCNC: 154 MG/DL (ref 74–100)
HCT VFR BLD AUTO: 36.4 % (ref 42–52)
HGB BLD-MCNC: 12.6 G/DL (ref 14–18)
IMM GRANULOCYTES # BLD AUTO: 0.06 X10(3)/MCL (ref 0–0.04)
IMM GRANULOCYTES NFR BLD AUTO: 0.5 %
LYMPHOCYTES # BLD AUTO: 0.92 X10(3)/MCL (ref 0.6–4.6)
LYMPHOCYTES NFR BLD AUTO: 8.3 %
MCH RBC QN AUTO: 31 PG (ref 27–31)
MCHC RBC AUTO-ENTMCNC: 34.6 G/DL (ref 33–36)
MCV RBC AUTO: 89.4 FL (ref 80–94)
MONOCYTES # BLD AUTO: 0.77 X10(3)/MCL (ref 0.1–1.3)
MONOCYTES NFR BLD AUTO: 7 %
NEUTROPHILS # BLD AUTO: 9.25 X10(3)/MCL (ref 2.1–9.2)
NEUTROPHILS NFR BLD AUTO: 83.7 %
NRBC BLD AUTO-RTO: 0 %
PLATELET # BLD AUTO: 130 X10(3)/MCL (ref 130–400)
PLATELETS.RETICULATED NFR BLD AUTO: 3.5 % (ref 0.9–11.2)
PMV BLD AUTO: 10 FL (ref 7.4–10.4)
POTASSIUM SERPL-SCNC: 4.4 MMOL/L (ref 3.5–5.1)
PROT SERPL-MCNC: 6.2 GM/DL (ref 6.4–8.3)
RBC # BLD AUTO: 4.07 X10(6)/MCL (ref 4.7–6.1)
SODIUM SERPL-SCNC: 140 MMOL/L (ref 136–145)
WBC # BLD AUTO: 11.05 X10(3)/MCL (ref 4.5–11.5)

## 2024-06-19 PROCEDURE — 25000003 PHARM REV CODE 250: Performed by: NURSE PRACTITIONER

## 2024-06-19 PROCEDURE — 97161 PT EVAL LOW COMPLEX 20 MIN: CPT

## 2024-06-19 PROCEDURE — 94799 UNLISTED PULMONARY SVC/PX: CPT

## 2024-06-19 PROCEDURE — 85025 COMPLETE CBC W/AUTO DIFF WBC: CPT | Performed by: NURSE PRACTITIONER

## 2024-06-19 PROCEDURE — 63600175 PHARM REV CODE 636 W HCPCS: Performed by: NURSE PRACTITIONER

## 2024-06-19 PROCEDURE — 36415 COLL VENOUS BLD VENIPUNCTURE: CPT | Performed by: NURSE PRACTITIONER

## 2024-06-19 PROCEDURE — 80053 COMPREHEN METABOLIC PANEL: CPT | Performed by: NURSE PRACTITIONER

## 2024-06-19 PROCEDURE — 63600175 PHARM REV CODE 636 W HCPCS

## 2024-06-19 RX ORDER — METHOCARBAMOL 500 MG/1
500 TABLET, FILM COATED ORAL EVERY 8 HOURS
Qty: 30 TABLET | Refills: 0 | Status: SHIPPED | OUTPATIENT
Start: 2024-06-19 | End: 2024-06-29

## 2024-06-19 RX ORDER — ASPIRIN 81 MG/1
81 TABLET ORAL 2 TIMES DAILY
Qty: 60 TABLET | Refills: 0 | Status: SHIPPED | OUTPATIENT
Start: 2024-06-19 | End: 2025-06-19

## 2024-06-19 RX ORDER — DOCUSATE SODIUM 100 MG/1
100 CAPSULE, LIQUID FILLED ORAL 2 TIMES DAILY PRN
Qty: 30 CAPSULE | Refills: 0 | Status: SHIPPED | OUTPATIENT
Start: 2024-06-19

## 2024-06-19 RX ORDER — OXYCODONE HYDROCHLORIDE 5 MG/1
5 TABLET ORAL EVERY 8 HOURS PRN
Qty: 10 TABLET | Refills: 0 | Status: SHIPPED | OUTPATIENT
Start: 2024-06-19

## 2024-06-19 RX ORDER — ACETAMINOPHEN 325 MG/1
650 TABLET ORAL EVERY 6 HOURS PRN
Qty: 30 TABLET | Refills: 0 | Status: SHIPPED | OUTPATIENT
Start: 2024-06-19

## 2024-06-19 RX ORDER — ASPIRIN 81 MG/1
81 TABLET ORAL DAILY
Qty: 30 TABLET | Refills: 0 | Status: SHIPPED | OUTPATIENT
Start: 2024-06-19 | End: 2024-06-19

## 2024-06-19 RX ORDER — GABAPENTIN 300 MG/1
300 CAPSULE ORAL 3 TIMES DAILY
Qty: 90 CAPSULE | Refills: 5 | Status: SHIPPED | OUTPATIENT
Start: 2024-06-19 | End: 2025-06-19

## 2024-06-19 RX ADMIN — DOCUSATE SODIUM 100 MG: 100 CAPSULE, LIQUID FILLED ORAL at 08:06

## 2024-06-19 RX ADMIN — ENOXAPARIN SODIUM 40 MG: 40 INJECTION SUBCUTANEOUS at 08:06

## 2024-06-19 RX ADMIN — CEFAZOLIN SODIUM 2 G: 2 SOLUTION INTRAVENOUS at 05:06

## 2024-06-19 RX ADMIN — ACETAMINOPHEN 650 MG: 325 TABLET, FILM COATED ORAL at 05:06

## 2024-06-19 RX ADMIN — ONDANSETRON 4 MG: 2 INJECTION INTRAMUSCULAR; INTRAVENOUS at 08:06

## 2024-06-19 RX ADMIN — ACETAMINOPHEN 650 MG: 325 TABLET, FILM COATED ORAL at 10:06

## 2024-06-19 RX ADMIN — METHOCARBAMOL 500 MG: 500 TABLET ORAL at 05:06

## 2024-06-19 RX ADMIN — GABAPENTIN 300 MG: 300 CAPSULE ORAL at 08:06

## 2024-06-19 RX ADMIN — POLYETHYLENE GLYCOL 3350 17 G: 17 POWDER, FOR SOLUTION ORAL at 08:06

## 2024-06-19 RX ADMIN — OXYCODONE HYDROCHLORIDE 5 MG: 5 TABLET ORAL at 05:06

## 2024-06-19 NOTE — PROGRESS NOTES
[facility] - [facility]  Orthopedics  Progress Note    Patient Name: Ángel Borges Jr.  MRN: [id number]  Admission Date: 6/17/2024  Hospital Length of Stay: 2 days  Attending Provider: Jose L Gracia MD  Primary Care Provider: Leelee Primary Doctor  Follow-up For: Procedure(s) (LRB):  ORIF, SACROILIAC JOINT (Right)    Post-Operative Day: 1 Day Post-Op  Subjective:     Principal Problem:Fall    Principal Orthopedic Problem: 1 Day Post-Op   S/P CRPP pelvic ring    Interval History: Seen this am. Tolerating breakfast. Asking about DC home today. Pain well controlled. NVI BLE distally.     Review of patient's allergies indicates:   Allergen Reactions    Camphor-methyl sal-menthol-pep Hives       Current Facility-Administered Medications   Medication    acetaminophen tablet 650 mg    cefazolin (ANCEF) 2 gram in dextrose 5% 50 mL IVPB (premix)    docusate sodium capsule 100 mg    enoxaparin injection 40 mg    gabapentin capsule 300 mg    magnesium hydroxide 400 mg/5 ml suspension 2,400 mg    melatonin tablet 6 mg    methocarbamoL tablet 500 mg    morphine injection 2 mg    ondansetron injection 4 mg    oxyCODONE immediate release tablet 5 mg    oxyCODONE immediate release tablet Tab 10 mg    polyethylene glycol packet 17 g     Objective:     Vital Signs (Most Recent):  Temp: 97.5 °F (36.4 °C) (06/19/24 1030)  Pulse: 88 (06/19/24 1030)  Resp: 18 (06/19/24 1030)  BP: 118/74 (06/19/24 1030)  SpO2: 99 % (06/19/24 1030) Vital Signs (24h Range):  Temp:  [97.4 °F (36.3 °C)-98 °F (36.7 °C)] 97.5 °F (36.4 °C)  Pulse:  [] 88  Resp:  [13-18] 18  SpO2:  [92 %-100 %] 99 %  BP: ()/(54-91) 118/74     Weight: 74.8 kg (165 lb)  Height: 6' (182.9 cm)  Body mass index is 22.38 kg/m².      Intake/Output Summary (Last 24 hours) at 6/19/2024 1127  Last data filed at 6/19/2024 0101  Gross per 24 hour   Intake 1360 ml   Output 1400 ml   Net -40 ml       Physical Exam:   General the patient is alert and oriented x3 no  acute distress nontoxic-appearing appropriate affect.             LLE: -Skin: No signs of new abrasions or lacerations, no scars           -MSK: Hip and Knee F/E, EHL/FHL, Gastroc/Tib anterior Strength 5/5           -Neuro:  Sensation intact to light touch L3-S1 dermatomes           -Lymphatic: No signs of lymphadenopathy           -CV: Capillary refill is less than 2 seconds. + DP  RLE: -Skin: Dressing CDI, No signs of new abrasions or lacerations, no scars           -MSK: : Hip and Knee F/E, EHL/FHL, Gastroc/Tib anterior Strength 5/5           -Neuro:  Sensation intact to light touch L3-S1 dermatomes           -Lymphatic: No signs of lymphadenopathy           -CV: Capillary refill is less than 2 seconds. +DP      Diagnostic Findings:     Significant Labs: CBC:   Recent Labs   Lab 06/17/24  1535 06/18/24  0256 06/19/24  0540   WBC 7.11 8.63 11.05   HGB 15.9 13.0* 12.6*   HCT 45.1 38.7* 36.4*    133 130     All pertinent labs within the past 24 hours have been reviewed.    Significant Imaging: I have reviewed all pertinent imaging results/findings.     Assessment/Plan:     Active Diagnoses:    Diagnosis Date Noted POA    PRINCIPAL PROBLEM:  Fall [W19.XXXA] 06/18/2024 Yes    Closed pelvic ring fracture [S32.810A] 06/18/2024 Yes      Problems Resolved During this Admission:   44 YO here with pelvic ring injury  POD #1 CRPP R posterior pelvis    Diet as tolerated  Pain multimodal PRN  DVT: lovenox, gfo16pd BID on DC  PT/OT eval  Activity: WBAT LLE, 50%wb RLE  Dressing change tomorrow  Follow up with Dr Munoz in 2 weeks     The above findings, diagnostics, and treatment plan were discussed with Dr Munoz who is in agreement with the plan of care except as stated in additional documentation.      BALDOMERO Espinoza   Orthopedic Trauma Surgery  [facility]

## 2024-06-19 NOTE — DISCHARGE SUMMARY
[facility] Trauma Surgery Discharge Summary    Admitting Physician: Jose L Gracia MD  Attending Physician: Jose L Gracia MD  Date of Admit: 6/17/2024  Date of Discharge: 6/19/2024    Discharge to: Home or Self Care   Condition: Good    Discharge Diagnoses     Patient Active Problem List   Diagnosis    Fall    Closed pelvic ring fracture       Consultants and Procedures     Consultants:  Consults (From admission, onward)          Status Ordering Provider     Inpatient consult to Social Work/Case Management  Once        Provider:  (Not yet assigned)    Acknowledged REMIGIO GROVE             Procedures:   R perc posterior pelvic ring fixation of SI and S2     Brief History of Present Illness      43 year old male presented from OSH s/p fall from scaffolding landing on side. C/o bilateral hip pain with no distal neurological changes. CT from outside facility with R sacral ala and L pubic bone fractures. No acute traumatic injuries of CT head or neck.     Hospital Course with Pertinent Findings     Patient was taken to the OR by Ortho for Right percutaneous posterior pelvic ring stabilization S1 and S2. He tolerated procedure well. He was evaluated by PT/OT who cleared him for discharge home. His pain is well controlled. He is 50% weight bearing to RLE and can weight bear as tolerated to LLE, per Ortho. Case management was consulted for assistance with providing DME supplies.    At this time, patient is determined to have maximized benefits of inpatient hospitalization. He is discharged in stable condition with outpatient follow up recommendation and instructions.  All questions answered, patient and verbalized agreement with the plan of care.  They were given return precautions prior to discharge including symptoms that should prompt return to the emergency department or to call PCP.       Physical Exam  Vitals reviewed.   Constitutional:       General: He is not in acute distress.     Appearance: Normal appearance. He is  not ill-appearing.   HENT:      Head: Normocephalic and atraumatic.   Cardiovascular:      Rate and Rhythm: Normal rate and regular rhythm.      Pulses: Normal pulses.      Heart sounds: Normal heart sounds. No murmur heard.     No friction rub.   Pulmonary:      Effort: Pulmonary effort is normal. No respiratory distress.      Breath sounds: Normal breath sounds. No wheezing or rhonchi.   Abdominal:      General: Bowel sounds are normal. There is no distension.      Palpations: Abdomen is soft.      Tenderness: There is no abdominal tenderness. There is no guarding or rebound.   Musculoskeletal:      Comments: pain in pelvis with ROM of BLE    Skin:     General: Skin is warm and dry.      Comments: Well perfused   Neurological:      General: No focal deficit present.      Mental Status: He is alert and oriented to person, place, and time.      Cranial Nerves: Cranial nerves 2-12 are intact.      Sensory: Sensation is intact.      Motor: Motor function is intact.         Vitals:    06/19/24 0659   BP: 104/69   Pulse: 89   Resp: 18   Temp: 97.9 °F (36.6 °C)           TIME SPENT ON DISCHARGE: 60 minutes    Discharge Medications        Medication List        START taking these medications      acetaminophen 325 MG tablet  Commonly known as: TYLENOL  Take 2 tablets (650 mg total) by mouth every 6 (six) hours as needed for Pain.     aspirin 81 MG EC tablet  Commonly known as: ECOTRIN  Take 1 tablet (81 mg total) by mouth 2 (two) times a day.     docusate sodium 100 MG capsule  Commonly known as: COLACE  Take 1 capsule (100 mg total) by mouth 2 (two) times daily as needed for Constipation.     gabapentin 300 MG capsule  Commonly known as: NEURONTIN  Take 1 capsule (300 mg total) by mouth 3 (three) times daily.     methocarbamoL 500 MG Tab  Commonly known as: ROBAXIN  Take 1 tablet (500 mg total) by mouth every 8 (eight) hours. for 10 days     oxyCODONE 5 MG immediate release tablet  Commonly known as: ROXICODONE  Take 1  tablet (5 mg total) by mouth every 8 (eight) hours as needed for Pain (for severe pain).               Where to Get Your Medications        These medications were sent to [facility] - [place] - [address]  [address], [facility] [address]      Phone: [phone]   acetaminophen 325 MG tablet  aspirin 81 MG EC tablet  docusate sodium 100 MG capsule  gabapentin 300 MG capsule  methocarbamoL 500 MG Tab  oxyCODONE 5 MG immediate release tablet         Discharge Information:     - Rx sent for enteric coated ASA 81mg BID  x 4 weeks, per Ortho recs  - Follow up with Ortho outpatient in 2 weeks for wound recheck.  - Crutches provided to patient      Connie Clifford M.D  [facility] Family Medicine Resident, [facility]

## 2024-06-19 NOTE — PT/OT/SLP EVAL
Physical Therapy Evaluation and Discharge Note    Patient Name:  Ángel Borges Jr.   MRN:  [id number]    Recommendations:     Discharge therapy intensity: No Therapy Indicated   Discharge Equipment Recommendations: crutches, axillary   Barriers to discharge: None    Assessment:     Ángel Borges Jr. is a 43 y.o. male admitted with a medical diagnosis of multiple pelvic fractures with unstable disruption of pelvic [place] s/p R percutaneous posterior pelvic ring stabilization S1, R percutaneous posterior stabilization S2. At this time, patient is functioning at their prior level of function and does not require further acute PT services.     Recent Surgery: Procedure(s) (LRB):  ORIF, SACROILIAC JOINT (Right) 1 Day Post-Op    Plan:     During this hospitalization, patient does not require further acute PT services.  Please re-consult if situation changes.      Subjective     Chief Complaint: none  Patient/Family Comments/goals: none  Pain/Comfort:  Pain Rating 1: 3/10  Location - Side 1: Left  Location 1:  (pelvis)  Pain Addressed 1: Distraction, Reposition  Pain Rating Post-Intervention 1: 3/10    Patients cultural, spiritual, Christian conflicts given the current situation: no    Living Environment:  Lives with spouse and children SL with 3 steps (bilateral rails) to enter.   Prior to admission, patients level of function was independent.  Equipment used at home: none.  DME owned (not currently used): none.  Upon discharge, patient will have assistance from family.    Objective:     Communicated with nurse prior to session.  Patient found HOB elevated with peripheral IV upon PT entry to room.    General Precautions: Standard,      Orthopedic Precautions: (50% WB RLE, WBAT LLE c RW, full ROM BLE)   Braces: N/A  Respiratory Status: Room air    Exams:  Cognitive Exam:  Patient is oriented to Person, Place, Time, and Situation  Sensation: -       Intact  RLE ROM: WFL  RLE Strength: 4/5 grossly  LLE ROM:  WFL  LLE Strength: WFL    Functional Mobility:  Bed Mobility:  Supine to Sit: independence  Sit to Supine: independence  Transfers:  Sit to Stand:  modified independence with axillary crutches  Gait: 85 ft with crutches and MOD I  Balance: good  Stairs:  Pt ascended/descended 3 stair(s) with Axillary crutches with no handrails with Modified Independent.     AM-PAC 6 CLICK MOBILITY  Total Score:24     Education Provided:  Role and goals of PT, transfer training, bed mobility, gait training, stair training, balance training, safety awareness, assistive device, strengthening exercises, and importance of participating in PT to return to PLOF.    Patient left HOB elevated with all lines intact and call button in reach.    GOALS:   Multidisciplinary Problems       Physical Therapy Goals       Not on file                    History:     Past Medical History:   Diagnosis Date    Seizure disorder     last seizure, 2017       Past Surgical History:   Procedure Laterality Date    BRAIN TUMOR EXCISION      2017       Time Tracking:     PT Received On: 06/19/24  PT Start Time: 0815     PT Stop Time: 0830  PT Total Time (min): 15 min     Billable Minutes: Evaluation 15 minutes      06/19/2024

## 2024-06-19 NOTE — PLAN OF CARE
06/19/24 1017   Final Note   Assessment Type Final Discharge Note   Anticipated Discharge Disposition Home   Post-Acute Status   Post-Acute Authorization HME   HME Status Set-up Complete/Auth obtained   Discharge Delays None known at this time     Pt to d/c home. Crutches provided by therapy.     Yolanda Whaley LCSW

## 2024-06-19 NOTE — PROGRESS NOTES
TERTIARY TRAUMA SURVEY (TTS)    List Injuries Identified to Date:   1. R sacral ala and left pubic bone fracture    []Problems list reviewed  List Operations and Procedures:   1. R perc posterior pelvic ring fixation S1 and S2    Past Surgical History:   Procedure Laterality Date    BRAIN TUMOR EXCISION      2017       Incidental findings:   1. Degenerative changes in the cervical spine     Past Medical History:   Hx of brain tumor    Active Ambulatory Problems     Diagnosis Date Noted    No Active Ambulatory Problems     Resolved Ambulatory Problems     Diagnosis Date Noted    No Resolved Ambulatory Problems     Past Medical History:   Diagnosis Date    Seizure disorder      Past Medical History:   Diagnosis Date    Seizure disorder     last seizure, 2017       Tertiary Physical Exam:     Physical Exam  Vitals reviewed.   Constitutional:       General: He is not in acute distress.     Appearance: Normal appearance. He is not ill-appearing.   HENT:      Head: Normocephalic and atraumatic.   Cardiovascular:      Rate and Rhythm: Normal rate and regular rhythm.      Pulses: Normal pulses.      Heart sounds: Normal heart sounds. No murmur heard.     No friction rub.   Pulmonary:      Effort: Pulmonary effort is normal. No respiratory distress.      Breath sounds: Normal breath sounds. No wheezing or rhonchi.   Abdominal:      General: Bowel sounds are normal. There is no distension.      Palpations: Abdomen is soft.      Tenderness: There is no abdominal tenderness. There is no guarding or rebound.   Musculoskeletal:      Comments: pain in pelvis with ROM of BLE    Skin:     General: Skin is warm and dry.      Comments: Well perfused   Neurological:      General: No focal deficit present.      Mental Status: He is alert and oriented to person, place, and time.      Cranial Nerves: Cranial nerves 2-12 are intact.      Sensory: Sensation is intact.      Motor: Motor function is intact.         Imaging Review:  "    Imaging Results    None          Lab Review:   CBC:  Recent Labs   Lab Result Units 06/17/24  1535 06/18/24  0256 06/19/24  0540   WBC x10(3)/mcL 7.11 8.63 11.05   RBC x10(6)/mcL 5.14 4.23* 4.07*   Hgb g/dL 15.9 13.0* 12.6*   Hct % 45.1 38.7* 36.4*   Platelet x10(3)/mcL 205 133 130   MCV fL 87.7 91.5 89.4   MCH pg 30.9 30.7 31.0   MCHC g/dL 35.3 33.6 34.6       CMP:  Recent Labs   Lab Result Units 06/17/24  1535 06/18/24  0354 06/19/24  0540   Calcium mg/dL 9.9 8.2* 8.8   Albumin g/dL 4.5 3.0* 3.4*   Sodium mmol/L 141 137 140   Potassium mmol/L 4.0 3.6 4.4   CO2 mmol/L 26 26 29   Chloride mmol/L 105 106 105   Blood Urea Nitrogen mg/dL 14.0 11.5 11.8   Creatinine mg/dL 1.38* 0.93 1.09   ALP unit/L 59 39* 47   ALT unit/L 27 19 18   AST unit/L 33 20 22   Bilirubin Total mg/dL 0.8 0.6 0.3       Troponin:  No results for input(s): "TROPONINI" in the last 2160 hours.    ETOH:  No results for input(s): "ETHANOL" in the last 72 hours.     Urine Drug Screen:  Recent Labs     06/17/24  1552   FENTANYL Negative   MDMA Negative          Plan:   R sacral ala  and L pubic bone fx  - s/p CRPP  - RLE 50% WB, WBAT LLE with walker  - Reg diet  - Daily labs  - MM pain control  - IS  - PT/OT to evaluate  - ASA upon discharge x 4 weeks.  - Prophylactic Lovenox 40mg BID   - CM for DME supplies          Connie Clifford MD  [facility] Family Medicine Resident, [facility]      "

## 2024-06-19 NOTE — PLAN OF CARE
Spoke with pt this am, he confirms he will dc today and is doing well with crutches.   We discussed if his situation would be workman's comp etc. Pt tells me it will not he was working but he is his own employer.   He anticipates all bills to [facility].

## 2024-06-19 NOTE — DISCHARGE INSTRUCTIONS
Please attend all follow up appointments as indicated in your discharge summary.     Please take all medications as prescribed in this discharge summary.     If you begin to develop signs/symptoms of infection such as fever, redness, warmth, swelling, or oozing, please notify your physician immediately or go to your nearest emergency room.      Weight-bearing as tolerated to left lower extremity.  Please use crutches for support.

## 2024-06-19 NOTE — NURSING
Discussed discharge instructions with patient regarding medications, follow-up appointments, s/s infection, and weight-bearing status.  Crutches and medications delivered to bedside.  Answered all questions and patient verbalized understanding.  IV discontinued, VSS.  Patient gathered belongings.  Discharged by wheelchair to home via ER entrance.

## 2024-06-19 NOTE — PLAN OF CARE
Notified by PT that pt will only need crutches and they will order. No CM needs known at this time.    Yolanda Whaley LCSW

## 2024-07-01 ENCOUNTER — OFFICE VISIT (OUTPATIENT)
Dept: ORTHOPEDICS | Facility: CLINIC | Age: 44
End: 2024-07-01
Payer: MEDICAID

## 2024-07-01 VITALS
HEIGHT: 72 IN | DIASTOLIC BLOOD PRESSURE: 75 MMHG | HEART RATE: 86 BPM | WEIGHT: 163.13 LBS | BODY MASS INDEX: 22.09 KG/M2 | SYSTOLIC BLOOD PRESSURE: 117 MMHG

## 2024-07-01 DIAGNOSIS — S32.810A CLOSED PELVIC RING FRACTURE, INITIAL ENCOUNTER: Primary | ICD-10-CM

## 2024-07-01 RX ORDER — OXYCODONE AND ACETAMINOPHEN 7.5; 325 MG/1; MG/1
1 TABLET ORAL EVERY 8 HOURS PRN
Qty: 21 TABLET | Refills: 0 | Status: CANCELLED | OUTPATIENT
Start: 2024-07-01

## 2024-07-01 RX ORDER — OXYCODONE AND ACETAMINOPHEN 5; 325 MG/1; MG/1
1 TABLET ORAL EVERY 8 HOURS PRN
Qty: 21 TABLET | Refills: 0 | Status: SHIPPED | OUTPATIENT
Start: 2024-07-01

## 2024-07-01 RX ORDER — METHOCARBAMOL 500 MG/1
500 TABLET, FILM COATED ORAL EVERY 8 HOURS
Qty: 30 TABLET | Refills: 0 | Status: SHIPPED | OUTPATIENT
Start: 2024-07-01 | End: 2024-07-11

## 2024-07-01 NOTE — PROGRESS NOTES
[facility]      Name: Ángel Borges Jr.  : 1980  MRN: [id number]  Date: 2024    Chief Complaint   Patient presents with    Pelvis - Post-op Evaluation     1.5 week f/u from perc pinning pelvic ring fx. Crutches used for ambulation. Reports intermittent pain worse with sitting.        Subjective:      Chief Complaint   Patient presents with    Pelvis - Post-op Evaluation     1.5 week f/u from perc pinning pelvic ring fx. Crutches used for ambulation. Reports intermittent pain worse with sitting.         HPI  Ángel Borges Jr. is a 43 y.o. male presents to clinic for 13 day follow up status post percutaneous pinning of right SI joint and nonoperative superior pubic rami fracture on the left.  Reports his right side has bothered him but he does have anterior left hip pain.  Has crutches today and he uses for ambulation.  No other complaints at this time.      ROS:  Constitutional: Denies fever chills  MSK: Pain evident at site of injury located in HPI,   Integ: No signs of abrasions or lacerations  Neuro: No numbness or tingling  Lymphatic: No swelling outside the area of injury     Current Outpatient Medications   Medication Instructions    acetaminophen (TYLENOL) 650 mg, Oral, Every 6 hours PRN    aspirin (ECOTRIN) 81 mg, Oral, 2 times daily    docusate sodium (COLACE) 100 mg, Oral, 2 times daily PRN    gabapentin (NEURONTIN) 300 mg, Oral, 3 times daily    methocarbamoL (ROBAXIN) 500 mg, Oral, Every 8 hours    oxyCODONE-acetaminophen (PERCOCET) 5-325 mg per tablet 1 tablet, Oral, Every 8 hours PRN        Objective:     Visit Vitals  /75   Pulse 86   Ht 6' (1.829 m)   Wt 74 kg (163 lb 2.3 oz)   BMI 22.13 kg/m²     Physical Exam    General the patient is alert and oriented x3 no acute distress nontoxic-appearing appropriate affect.    Constitutional: Vital signs are examined and stable.  Resp: No signs of labored breathing      Right lower extremity:            "-Skin:  Incision well healed without evidence of dehiscence or infection; no erythema or discharge. Staples present.            -MSK: Hip and Knee F/E, EHL/FHL, Gastroc/Tib anterior motor intact.  No painful or prominent hardware.           -Neuro:  Sensation intact to light touch L3-S1 dermatomes           -Lymphatic:  Nonpitting edema at surgical site           -CV:  Posterior tibialis pulse palpable.  Compartments soft and compressible          Body mass index is 22.13 kg/m².  Ideal body weight: 77.6 kg (171 lb 1.2 oz)  Hgb   Date Value Ref Range Status   06/19/2024 12.6 (L) 14.0 - 18.0 g/dL Final   06/18/2024 13.0 (L) 14.0 - 18.0 g/dL Final     Hct   Date Value Ref Range Status   06/19/2024 36.4 (L) 42.0 - 52.0 % Final   06/18/2024 38.7 (L) 42.0 - 52.0 % Final     No results found for: "[id number]"  WBC   Date Value Ref Range Status   06/19/2024 11.05 4.50 - 11.50 x10(3)/mcL Final   06/18/2024 8.63 4.50 - 11.50 x10(3)/mcL Final       Assessment:       ICD-10-CM ICD-9-CM   1. Closed pelvic ring fracture, initial encounter  S32.810A 808.8       Plan:     1. Closed pelvic ring fracture, initial encounter  -     methocarbamoL (ROBAXIN) 500 MG Tab; Take 1 tablet (500 mg total) by mouth every 8 (eight) hours. for 10 days  Dispense: 30 tablet; Refill: 0  -     oxyCODONE-acetaminophen (PERCOCET) 5-325 mg per tablet; Take 1 tablet by mouth every 8 (eight) hours as needed for Pain.  Dispense: 21 tablet; Refill: 0      Post op imaging reviewed with patient.  Staples removed today.  Steri-Strips placed on incision site.  Patient to remain 50% weight-bearing to right lower extremity.  Patient using crutches for ambulation at this time.  He is to continue taking 81 mg aspirin b.i.d. until fully ambulatory.  Follow up in 1 month for repeat imaging and evaluation.  Medications refilled today.    Pt verbalizes understanding and agrees with tx plan. Pt to call clinic with any questions/concerns.      The above findings, " diagnostics, and treatment plan were discussed with Dr. Munoz who is in agreement with the plan of care except as stated in additional documentation.     Ashley Gunther, PA-C [facility]    Future Appointments   Date Time Provider Department Center   8/6/2024  1:00 PM Ez Munoz [facility] [place]       This note was created with the assistance of voice recognition software or phone dictation. There may be transcription errors as a result of using this technology however minimal. Effort has been made to assure accuracy of transcription but any obvious errors or omissions should be clarified with the author of the document.

## 2024-07-24 ENCOUNTER — HOSPITAL ENCOUNTER (EMERGENCY)
Facility: HOSPITAL | Age: 44
Discharge: HOME | End: 2024-07-24
Attending: FAMILY MEDICINE
Payer: MEDICAID

## 2024-07-24 VITALS
DIASTOLIC BLOOD PRESSURE: 73 MMHG | RESPIRATION RATE: 20 BRPM | SYSTOLIC BLOOD PRESSURE: 106 MMHG | WEIGHT: 160 LBS | TEMPERATURE: 98 F | HEIGHT: 72 IN | HEART RATE: 90 BPM | BODY MASS INDEX: 21.67 KG/M2 | OXYGEN SATURATION: 95 %

## 2024-07-24 DIAGNOSIS — N39.0 URINARY TRACT INFECTION WITH HEMATURIA, SITE UNSPECIFIED: ICD-10-CM

## 2024-07-24 DIAGNOSIS — R31.9 URINARY TRACT INFECTION WITH HEMATURIA, SITE UNSPECIFIED: ICD-10-CM

## 2024-07-24 DIAGNOSIS — N41.0 ACUTE PROSTATITIS: ICD-10-CM

## 2024-07-24 DIAGNOSIS — S63.502A SPRAIN OF LEFT WRIST, INITIAL ENCOUNTER: Primary | ICD-10-CM

## 2024-07-24 DIAGNOSIS — R52 PAIN: ICD-10-CM

## 2024-07-24 LAB
BACTERIA #/AREA URNS AUTO: ABNORMAL /HPF
BILIRUB UR QL STRIP.AUTO: NEGATIVE
CLARITY UR: CLEAR
COLOR UR AUTO: YELLOW
GLUCOSE UR QL STRIP: NEGATIVE
HGB UR QL STRIP: ABNORMAL
KETONES UR QL STRIP: NEGATIVE
LEUKOCYTE ESTERASE UR QL STRIP: NEGATIVE
MUCOUS THREADS URNS QL MICRO: ABNORMAL /LPF
NITRITE UR QL STRIP: NEGATIVE
PH UR STRIP: 6 [PH]
PROT UR QL STRIP: NEGATIVE
RBC #/AREA URNS AUTO: ABNORMAL /HPF
SP GR UR STRIP.AUTO: >=1.03 (ref 1–1.03)
SQUAMOUS #/AREA URNS AUTO: ABNORMAL /HPF
UROBILINOGEN UR STRIP-ACNC: 0.2
WBC #/AREA URNS AUTO: ABNORMAL /HPF

## 2024-07-24 PROCEDURE — 81001 URINALYSIS AUTO W/SCOPE: CPT | Performed by: FAMILY MEDICINE

## 2024-07-24 PROCEDURE — 99284 EMERGENCY DEPT VISIT MOD MDM: CPT | Mod: 25

## 2024-07-24 PROCEDURE — 81003 URINALYSIS AUTO W/O SCOPE: CPT | Performed by: FAMILY MEDICINE

## 2024-07-24 RX ORDER — NAPROXEN 250 MG/1
250 TABLET ORAL 2 TIMES DAILY PRN
Qty: 15 TABLET | Refills: 0 | Status: SHIPPED | OUTPATIENT
Start: 2024-07-24

## 2024-07-24 RX ORDER — CIPROFLOXACIN 500 MG/1
500 TABLET ORAL 2 TIMES DAILY
Qty: 20 TABLET | Refills: 0 | Status: SHIPPED | OUTPATIENT
Start: 2024-07-24 | End: 2024-08-03

## 2024-07-24 NOTE — ED NOTES
Patient ambulated to ER room 2 with steady gait. Reports that he has been having pain when urinating and noticed some blood.  Also stated that he had an injury from a fall in the past and was never seen for his wrist. The pain and weakness of  has progressively gotten worse since initial incident.  Rates pain a 7/10 when moving.

## 2024-07-24 NOTE — ED PROVIDER NOTES
Encounter Date: 7/24/2024       History     Chief Complaint   Patient presents with    Wrist Pain    Dysuria     Left wrist pain and dysuria with hematuria noted when urinating     Patient presents to the emergency room with about a 1 month history of left wrist ache.  Was here a month ago with a pelvic fracture after a fall, and has noticed that his left wrist has been aching since.  Pt also noted a few days ago, scant blood in ejaculate and dysuria.  No pain.    The history is provided by the patient.     Review of patient's allergies indicates:   Allergen Reactions    Camphor-methyl sal-menthol-pep Hives     Past Medical History:   Diagnosis Date    Seizure disorder     last seizure, 2017     Past Surgical History:   Procedure Laterality Date    BRAIN TUMOR EXCISION      2017    OPEN REDUCTION AND INTERNAL FIXATION (ORIF) OF INJURY OF SACROILIAC JOINT Right 6/18/2024    Procedure: ORIF, SACROILIAC JOINT;  Surgeon: Ez Munoz MD;  Location: [facility];  Service: Orthopedics;  Laterality: Right;  supine wellington c arm zaheer Right SI screws     Family History   Problem Relation Name Age of Onset    No Known Problems Mother      No Known Problems Father      No Known Problems Sister      No Known Problems Brother       Social History     Tobacco Use    Smoking status: Some Days     Types: Vaping with nicotine    Smokeless tobacco: Never    Tobacco comments:     vaps   Substance Use Topics    Alcohol use: Not Currently     Comment: occassional    Drug use: Never     Review of Systems   Constitutional:  Negative for fever.   HENT:  Negative for sore throat.    Respiratory:  Negative for shortness of breath.    Cardiovascular:  Negative for chest pain.   Gastrointestinal:  Negative for nausea.   Genitourinary:  Positive for dysuria and hematuria.   Musculoskeletal:  Negative for back pain.   Skin:  Negative for rash.   Neurological:  Negative for weakness.   Hematological:  Does not bruise/bleed easily.   All other  systems reviewed and are negative.      Physical Exam     Initial Vitals [07/24/24 1007]   BP Pulse Resp Temp SpO2   106/73 90 20 98.1 °F (36.7 °C) 95 %      MAP       --         Physical Exam    Nursing note and vitals reviewed.  Constitutional: Vital signs are normal. He appears well-developed and well-nourished. He is cooperative.  Non-toxic appearance. He does not appear ill.   HENT:   Head: Normocephalic and atraumatic.   Eyes: Conjunctivae and lids are normal.   Neck: Trachea normal. Neck supple.   Cardiovascular:  Normal rate and regular rhythm.  No extrasystoles are present.          Pulmonary/Chest: Breath sounds normal.   Abdominal: Abdomen is soft. There is no abdominal tenderness.   Musculoskeletal:      Cervical back: Neck supple.      Comments: Left wrist mild diffusely tender, no swelling, full ROM, distal cap refill <3 sec, sensation intact.      Neurological: He is alert and oriented to person, place, and time. He has normal strength. No cranial nerve deficit or sensory deficit. He displays a negative Romberg sign.   Skin: Skin is warm, dry and intact. Capillary refill takes less than 2 seconds.   Psychiatric: He has a normal mood and affect. His speech is normal and behavior is normal. He is not actively hallucinating. He is attentive.         ED Course   Procedures  Labs Reviewed   URINALYSIS, REFLEX TO URINE CULTURE - Abnormal       Result Value    Color, UA Yellow      Appearance, UA Clear      Specific Gravity, UA >=1.030      pH, UA 6.0      Protein, UA Negative      Glucose, UA Negative      Ketones, UA Negative      Blood, UA 3+ (*)     Bilirubin, UA Negative      Urobilinogen, UA 0.2      Nitrites, UA Negative      Leukocyte Esterase, UA Negative     URINALYSIS, MICROSCOPIC - Abnormal    Bacteria, UA Rare      Mucous, UA Moderate (*)     RBC, UA 21-50 (*)     WBC, UA 3-5      Squamous Epithelial Cells, UA Rare            Imaging Results              X-Ray Wrist Complete Left (In process)                       Medications - No data to display  Medical Decision Making  Left wrist: no fx.    UA: 3+ blood    Sx c/w prostatitis.    Amount and/or Complexity of Data Reviewed  Radiology: ordered.    Risk  Prescription drug management.                                      Clinical Impression:  Final diagnoses:  [R52] Pain  [S63.502A] Sprain of left wrist, initial encounter (Primary)  [N39.0, R31.9] Urinary tract infection with hematuria, site unspecified  [N41.0] Acute prostatitis          ED Disposition Condition    Discharge Stable          ED Prescriptions       Medication Sig Dispense Start Date End Date Auth. Provider    naproxen (NAPROSYN) 250 MG tablet Take 1 tablet (250 mg total) by mouth 2 (two) times daily as needed (pain). 15 tablet 7/24/2024 -- Ángel Carroll MD    ciprofloxacin HCl (CIPRO) 500 MG tablet Take 1 tablet (500 mg total) by mouth 2 (two) times daily. for 10 days 20 tablet 7/24/2024 8/3/2024 Ángel Carroll MD          Follow-up Information       Follow up With Specialties Details Why Contact Info    Your PCP  Call  As needed              Ángel Carroll MD  07/24/24 1806

## 2024-08-06 ENCOUNTER — OFFICE VISIT (OUTPATIENT)
Dept: ORTHOPEDICS | Facility: CLINIC | Age: 44
End: 2024-08-06
Payer: MEDICAID

## 2024-08-06 ENCOUNTER — HOSPITAL ENCOUNTER (OUTPATIENT)
Dept: RADIOLOGY | Facility: CLINIC | Age: 44
Discharge: HOME | End: 2024-08-06
Attending: ORTHOPAEDIC SURGERY
Payer: MEDICAID

## 2024-08-06 VITALS — BODY MASS INDEX: 21.5 KG/M2 | HEIGHT: 72 IN | WEIGHT: 158.75 LBS

## 2024-08-06 DIAGNOSIS — S32.810D CLOSED PELVIC RING FRACTURE WITH ROUTINE HEALING, SUBSEQUENT ENCOUNTER: ICD-10-CM

## 2024-08-06 DIAGNOSIS — S32.810D CLOSED PELVIC RING FRACTURE WITH ROUTINE HEALING, SUBSEQUENT ENCOUNTER: Primary | ICD-10-CM

## 2024-08-06 PROCEDURE — 1159F MED LIST DOCD IN RCRD: CPT | Mod: CPTII,,, | Performed by: ORTHOPAEDIC SURGERY

## 2024-08-06 PROCEDURE — 99024 POSTOP FOLLOW-UP VISIT: CPT | Mod: ,,, | Performed by: ORTHOPAEDIC SURGERY

## 2024-08-06 PROCEDURE — 72190 X-RAY EXAM OF PELVIS: CPT | Mod: ,,, | Performed by: ORTHOPAEDIC SURGERY

## 2024-09-19 ENCOUNTER — HOSPITAL ENCOUNTER (OUTPATIENT)
Dept: RADIOLOGY | Facility: CLINIC | Age: 44
Discharge: HOME | End: 2024-09-19
Attending: ORTHOPAEDIC SURGERY
Payer: MEDICAID

## 2024-09-19 ENCOUNTER — OFFICE VISIT (OUTPATIENT)
Dept: ORTHOPEDICS | Facility: CLINIC | Age: 44
End: 2024-09-19
Payer: MEDICAID

## 2024-09-19 VITALS
HEART RATE: 89 BPM | DIASTOLIC BLOOD PRESSURE: 69 MMHG | WEIGHT: 158.75 LBS | HEIGHT: 72 IN | BODY MASS INDEX: 21.5 KG/M2 | SYSTOLIC BLOOD PRESSURE: 101 MMHG

## 2024-09-19 DIAGNOSIS — S32.810D CLOSED PELVIC RING FRACTURE WITH ROUTINE HEALING, SUBSEQUENT ENCOUNTER: Primary | ICD-10-CM

## 2024-09-19 DIAGNOSIS — S32.810D CLOSED PELVIC RING FRACTURE WITH ROUTINE HEALING, SUBSEQUENT ENCOUNTER: ICD-10-CM

## 2024-09-19 PROCEDURE — 72190 X-RAY EXAM OF PELVIS: CPT | Mod: ,,, | Performed by: ORTHOPAEDIC SURGERY

## 2024-09-19 NOTE — PROGRESS NOTES
Subjective:       Patient ID: Ángel Borges Jr. is a 43 y.o. male.    Chief Complaint   Patient presents with    Follow-up     3 mos, perc pinning pelvic ring fx 6/18/24-9/16/24, states has pain in the am when waking up, wbat, ambulates without assistance, states has been over doing it lately, no other complaints,         Patient is now 3 months status post percutaneous screw fixation posterior pelvic ring with right transsacral trans iliac screws.  He comes in today ambulating with no assistive devices.  He states that he has some mild soreness in the hip.  It hurts a little bit worse at the end of the day if he has been up and walking for several hours.  Reports he has pain after walking for a while which is better with rest.  States he attempted running which was not comfortable for him.  He does not want to do formal physical therapy but is open to a home exercise program after evaluation with physical therapist.  He otherwise has no complaints.    Follow-up  Pertinent negatives include no abdominal pain, chest pain, chills, congestion, coughing, fever, nausea, neck pain, numbness or vomiting.       Review of Systems   Constitutional: Negative for chills, fever and malaise/fatigue.   HENT:  Negative for congestion and hearing loss.    Eyes:  Negative for visual disturbance.   Cardiovascular:  Negative for chest pain and syncope.   Respiratory:  Negative for cough and shortness of breath.    Hematologic/Lymphatic: Does not bruise/bleed easily.   Skin:  Negative for color change and suspicious lesions.   Musculoskeletal:  Negative for falls and neck pain.   Gastrointestinal:  Negative for abdominal pain, nausea and vomiting.   Genitourinary:  Negative for dysuria and hematuria.   Neurological:  Negative for numbness and sensory change.   Psychiatric/Behavioral:  Negative for altered mental status. The patient is not nervous/anxious.         Current Outpatient Medications on File Prior to Visit   Medication  Sig Dispense Refill    acetaminophen (TYLENOL) 325 MG tablet Take 2 tablets (650 mg total) by mouth every 6 (six) hours as needed for Pain. (Patient not taking: Reported on 8/6/2024) 30 tablet 0    aspirin (ECOTRIN) 81 MG EC tablet Take 1 tablet (81 mg total) by mouth 2 (two) times a day. (Patient not taking: Reported on 8/6/2024) 60 tablet 0    docusate sodium (COLACE) 100 MG capsule Take 1 capsule (100 mg total) by mouth 2 (two) times daily as needed for Constipation. (Patient not taking: Reported on 8/6/2024) 30 capsule 0    gabapentin (NEURONTIN) 300 MG capsule Take 1 capsule (300 mg total) by mouth 3 (three) times daily. (Patient not taking: Reported on 8/6/2024) 90 capsule 5    naproxen (NAPROSYN) 250 MG tablet Take 1 tablet (250 mg total) by mouth 2 (two) times daily as needed (pain). (Patient not taking: Reported on 8/6/2024) 15 tablet 0    oxyCODONE-acetaminophen (PERCOCET) 5-325 mg per tablet Take 1 tablet by mouth every 8 (eight) hours as needed for Pain. (Patient not taking: Reported on 8/6/2024) 21 tablet 0     No current facility-administered medications on file prior to visit.          Objective:      /69   Pulse 89   Ht 6' (1.829 m)   Wt 72 kg (158 lb 11.7 oz)   BMI 21.53 kg/m²   Physical Exam  Constitutional:       General: He is not in acute distress.     Appearance: Normal appearance. He is not ill-appearing.   HENT:      Head: Normocephalic and atraumatic.      Nose: No congestion.   Eyes:      Extraocular Movements: Extraocular movements intact.   Cardiovascular:      Rate and Rhythm: Normal rate and regular rhythm.      Pulses: Normal pulses.   Pulmonary:      Effort: Pulmonary effort is normal.      Breath sounds: Normal breath sounds.   Abdominal:      General: There is no distension.      Palpations: Abdomen is soft.      Tenderness: There is no abdominal tenderness.   Musculoskeletal:      Comments: Right lower extremity: Surgical incision is well healed he tolerates passive range  motion of the right hip as well as a left he has equal and active range motion.  Some tenderness with passive internal and external rotation.  TTP over incision; no erythema or dehiscence.  Able to ambulate without assistive devices or pain.  Neurovascularly intact distally.   Skin:     General: Skin is warm and dry.   Neurological:      Mental Status: He is alert and oriented to person, place, and time. Mental status is at baseline.   Psychiatric:         Mood and Affect: Mood normal.         Behavior: Behavior normal.         Thought Content: Thought content normal.         Judgment: Judgment normal.        Body mass index is 21.53 kg/m².    Radiology:   Pelvis three views: AP and inlet outlet views of the pelvis demonstrate hardware to be intact, alignment is maintained.  He has evidence of consolidation over the anterior superior and inferior ramus in the left side.      Assessment:         1. Closed pelvic ring fracture with routine healing, subsequent encounter  X-Ray Pelvis AP Inlet And Outlet    Ambulatory referral/consult to Physical/Occupational Therapy              Plan:       He ambulates without significant pain.  He has good evidence of healing and his hardware is intact.  He may continue ambulating as tolerated.  May continue to wean into physical activities.  He is open to obtaining a home exercise program from physical therapist after evaluation.  He was provided with an order for therapy today.  Fractures have consolidated on x-ray.  Discussed option of following up in 3 months versus as needed follow up and patient would prefer to follow up as needed which I agree is appropriate.  He is happy with this plan of care today.  All questions and concerns were addressed.      This note/OR report was created with the assistance of  voice recognition software or phone  dictation.  There may be transcription errors as a result of using this technology however minimal. Effort has been made to assure  accuracy of transcription but any obvious errors or omissions should be clarified with the author of the document.     Luda Mejia PA-C  Orthopedic Trauma  [facility]      No follow-ups on file.    Closed pelvic ring fracture with routine healing, subsequent encounter  -     X-Ray Pelvis AP Inlet And Outlet; Future; Expected date: 09/19/2024  -     Ambulatory referral/consult to Physical/Occupational Therapy; Future; Expected date: 09/26/2024              Orders Placed This Encounter   Procedures    X-Ray Pelvis AP Inlet And Outlet     Standing Status:   Future     Number of Occurrences:   1     Standing Expiration Date:   9/18/2025     Order Specific Question:   May the Radiologist modify the order per protocol to meet the clinical needs of the patient?     Answer:   Yes     Order Specific Question:   Release to patient     Answer:   Immediate    Ambulatory referral/consult to Physical/Occupational Therapy     Standing Status:   Future     Standing Expiration Date:   10/19/2025     Referral Priority:   Routine     Referral Type:   Physical Medicine     Referral Reason:   Specialty Services Required     Requested Specialty:   Physical Therapy     Number of Visits Requested:   1       No future appointments.

## (undated) DEVICE — STAPLER SKIN PROXIMATE WIDE

## (undated) DEVICE — GLOVE SENSICARE PI GRN 6.5

## (undated) DEVICE — DRESSING TELFA + RECT 6X10IN

## (undated) DEVICE — GLOVE PROTEXIS LTX MICRO 8

## (undated) DEVICE — APPLICATOR CHLORAPREP ORN 26ML

## (undated) DEVICE — TAPE SILK 3IN

## (undated) DEVICE — COVER FULLGUARD SHOE HIGH-TOP

## (undated) DEVICE — GOWN SMARTSLEEVE AAMI LVL4 XL

## (undated) DEVICE — SPONGE LAP 18X18 PREWASHED

## (undated) DEVICE — DRESSING XEROFORM 5X9IN

## (undated) DEVICE — DRAPE C-ARMOR EQUIPMENT COVER

## (undated) DEVICE — DRAPE STERI U-SHAPED 47X51IN

## (undated) DEVICE — ELECTRODE REM POLYHESIVE II

## (undated) DEVICE — GLOVE SIGNATURE MICRO LTX 6.5

## (undated) DEVICE — DRAPE ORTH SPLIT 77X108IN

## (undated) DEVICE — GLOVE 8 PROTEXIS PI ORTHO

## (undated) DEVICE — Device